# Patient Record
Sex: FEMALE | Race: WHITE | HISPANIC OR LATINO | Employment: UNEMPLOYED | URBAN - METROPOLITAN AREA
[De-identification: names, ages, dates, MRNs, and addresses within clinical notes are randomized per-mention and may not be internally consistent; named-entity substitution may affect disease eponyms.]

---

## 2017-06-11 ENCOUNTER — HOSPITAL ENCOUNTER (EMERGENCY)
Facility: MEDICAL CENTER | Age: 32
End: 2017-06-11
Attending: EMERGENCY MEDICINE
Payer: COMMERCIAL

## 2017-06-11 VITALS
BODY MASS INDEX: 28 KG/M2 | OXYGEN SATURATION: 96 % | TEMPERATURE: 99.3 F | SYSTOLIC BLOOD PRESSURE: 108 MMHG | WEIGHT: 164.02 LBS | HEART RATE: 99 BPM | HEIGHT: 64 IN | RESPIRATION RATE: 16 BRPM | DIASTOLIC BLOOD PRESSURE: 64 MMHG

## 2017-06-11 DIAGNOSIS — J40 BRONCHITIS: ICD-10-CM

## 2017-06-11 PROCEDURE — 99283 EMERGENCY DEPT VISIT LOW MDM: CPT

## 2017-06-11 RX ORDER — AZITHROMYCIN 250 MG/1
TABLET, FILM COATED ORAL
Qty: 6 TAB | Refills: 0 | Status: SHIPPED | OUTPATIENT
Start: 2017-06-11 | End: 2020-01-13

## 2017-06-11 ASSESSMENT — PAIN SCALES - GENERAL: PAINLEVEL_OUTOF10: ASSUMED PAIN PRESENT

## 2017-06-11 ASSESSMENT — LIFESTYLE VARIABLES: DO YOU DRINK ALCOHOL: NO

## 2017-06-11 NOTE — ED AVS SNAPSHOT
Virtual Fairground Access Code: [email or web address]  Expires: 7/11/2017  1:17 PM    Your email address is not on file at [facility].  Email Addresses are required for you to sign up for Virtual Fairground, please contact [phone] to verify your personal information and to provide your email address prior to attempting to register for Virtual Fairground.    Patricia BonillaLarson  [address] [address]    [employer]  A secure, online tool to manage your health information     [facility]’s Virtual Fairground® is a secure, online tool that connects you to your personalized health information from the privacy of your home -- day or night - making it very easy for you to manage your healthcare. Once the activation process is completed, you can even access your medical information using the Virtual Fairground antonina, which is available for free in the Apple Antonina store or Google Play store.     To learn more about Virtual Fairground, visit [email or web address]    There are two levels of access available (as shown below):   My Chart Features  [facility] Primary Care Doctor [facility]  Specialists [facility]  Urgent  Care Non-[facility] Primary Care Doctor   Email your healthcare team securely and privately 24/7 X X X    Manage appointments: schedule your next appointment; view details of past/upcoming appointments X      Request prescription refills. X      View recent personal medical records, including lab and immunizations X X X X   View health record, including health history, allergies, medications X X X X   Read reports about your outpatient visits, procedures, consult and ER notes X X X X   See your discharge summary, which is a recap of your hospital and/or ER visit that includes your diagnosis, lab results, and care plan X X  X     How to register for [employer]:  Once your e-mail address has been verified, follow the following steps to sign up for [employer].     1. Go to  [email or web address]  2. Click on the Sign Up Now box, which takes you to the New Member Sign Up  page. You will need to provide the following information:  a. Enter your Alminder Access Code exactly as it appears at the top of this page. (You will not need to use this code after you’ve completed the sign-up process. If you do not sign up before the expiration date, you must request a new code.)   b. Enter your date of birth.   c. Enter your home email address.   d. Click Submit, and follow the next screen’s instructions.  3. Create a Alminder ID. This will be your Alminder login ID and cannot be changed, so think of one that is secure and easy to remember.  4. Create a Alminder password. You can change your password at any time.  5. Enter your Password Reset Question and Answer. This can be used at a later time if you forget your password.   6. Enter your e-mail address. This allows you to receive e-mail notifications when new information is available in Alminder.  7. Click Sign Up. You can now view your health information.    For assistance activating your Alminder account, call [phone]

## 2017-06-11 NOTE — ED PROVIDER NOTES
"ED Provider Note    CHIEF COMPLAINT  Chief Complaint   Patient presents with   • Cough   • Congestion       HPI  Patricia Denise is a 31 y.o. female who presents for evaluation of upper history symptoms.  Patient states her last day she developed nasal congestion, sore throat, cough with yellow to green sputum production along with generalized malaise.  Several family members have similar symptoms.  The patient denies: Night sweats, hemoptysis, abdominal pain, vomiting, hematemesis, melena, hematochezia, pregnancy, palpitations, syncope, pain or swelling in lower extremities.  No recent travel.  No recent antibiotic use.  Patient's history was obtained.  Family member who is interpreting along with my limited [place].  No other acute symptomatology or complaints.    REVIEW OF SYSTEMS  See HPI for further details.  No history of: Hypertension, diabetes, seizures, cardiopulmonary disorder, gastrointestinal disorder, asthma.  All other systems negative.    PAST MEDICAL HISTORY  Past Medical History   Diagnosis Date   • Thyroid disease      2006- Nodule; Bx was benign       FAMILY HISTORY  Family History   Problem Relation Age of Onset   • Cancer Maternal Grandfather        SOCIAL HISTORY  Nonsmoker; rare alcohol use; no drug use;    SURGICAL HISTORY  History reviewed. No pertinent past surgical history.    CURRENT MEDICATIONS  See nurses notes    ALLERGIES  No Known Allergies    PHYSICAL EXAM  VITAL SIGNS: /64 mmHg  Pulse 109  Temp(Src) 37.4 °C (99.3 °F)  Resp 18  Ht 1.626 m (5' 4\")  Wt 74.4 kg (164 lb 0.4 oz)  BMI 28.14 kg/m2  SpO2 98%   Constitutional: 31  female, Well developed, Well nourished, appears mildly weak but is oriented ×3  HENT: Normocephalic, Atraumatic, Nares:Clear, Oropharynx: moist, well hydrated, posterior pharynx:clear   Eyes: PERRL, EOMI, Conjunctiva normal, No discharge.   Neck: Normal range of motion, No tenderness, Supple, No stridor.   Lymphatic: No lymphadenopathy " noted.   Cardiovascular: Regular rate and rhythm without mumurs, gallups, rubs   Thorax & Lungs: Mild bilateral rhonchi, No respiratory distress, No wheezing, no stridor, no rales. No chest tenderness.   Abdomen: Soft, nontender, nondistended, no organomegaly, positive bowel sounds normal in quality. No guarding or rebound.  Skin: Good skin turgor, pink, warm, dry. No rashes, petechiae, purpura. Normal capillary refill.   Back: No tenderness, No CVA tenderness.   Extremities: Intact distal pulses, No edema, No tenderness, No cyanosis,  Vascular: Pulses are 2+, symmetric in the upper and lower extremities.  Negative Homans sign;  Musculoskeletal: Good range of motion in all major joints. No tenderness to palpation or major deformities noted.   Neurologic: Alert & oriented x 3,  No gross focal deficits noted.   Psychiatric: Affect normal, Judgment normal, Mood normal.     COURSE & MEDICAL DECISION MAKING  Pertinent Labs & Imaging studies reviewed. (See chart for details)  Discussion: At this time, the patient presents for evaluation of upper history symptoms.  The patient has no evidence of pneumonia on clinical exam and has no hypoxia.  There is also no associated bronchospasm at this time.  No signs of sepsis or toxicity.  Based on exam and findings, I see no indication for emergent laboratory imaging studies.  I discussed the findings and treatment plan with the patient.  She indicates she is comfortable with this exploration and disposition(through the ).    FINAL IMPRESSION  1. Bronchitis        PLAN  1.  Appropriate discharge instructions given  2.  Azithromycin, Z-Geo  3.  Tussionex syrup, 60 mL  4.  Follow-up with primary care  5.  Return at any time should she feel this is a change or worsening symptoms    Electronically signed by: Guy G Gansert, 6/11/2017 1:05 PM

## 2017-06-11 NOTE — ED AVS SNAPSHOT
Home Care Instructions                                                                                                                Patricia Denise   MRN: [id number]    Department:  [facility], Emergency Dept   Date of Visit:  6/11/2017            [facility], Emergency Dept    [address] [address]    Phone:  [phone]      You were seen by     Guy G Gansert, M.D.      Your Diagnosis Was     Bronchitis     J40       Follow-up Information     1. Follow up with [facility].    Contact information    [address] [place] [address] [phone]      Medication Information     Review all of your home medications and newly ordered medications with your primary doctor and/or pharmacist as soon as possible. Follow medication instructions as directed by your doctor and/or pharmacist.     Please keep your complete medication list with you and share with your physician. Update the information when medications are discontinued, doses are changed, or new medications (including over-the-counter products) are added; and carry medication information at all times in the event of emergency situations.               Medication List      START taking these medications        Instructions    Morning Afternoon Evening Bedtime    azithromycin 250 MG Tabs   Commonly known as:  ZITHROMAX        Take two tabs by mouth on day one, then one tab by mouth daily on days 2-5.                        Hydrocod Polst-CPM Polst ER 10-8 MG/5ML Suer   Commonly known as:  TUSSIONEX PENNKINETIC ER        Take 5 mL by mouth every 12 hours as needed for Cough or Rhinitis.   Dose:  5 mL                          ASK your doctor about these medications        Instructions    Morning Afternoon Evening Bedtime     MG Caps        Take 100 mg by mouth 2 times a day as needed for Constipation.   Dose:  100 mg                        ibuprofen 800 MG Tabs   Commonly known as:   MOTRIN        Take 1 Tab by mouth every 8 hours as needed (Cramping).   Dose:  800 mg                        prenatal plus vitamin 27-1 MG Tabs tablet        Take 1 Tab by mouth every morning.   Dose:  1 Tab                             Where to Get Your Medications      You can get these medications from any pharmacy     Bring a paper prescription for each of these medications    - azithromycin 250 MG Tabs  - Hydrocod Polst-CPM Polst ER 10-8 MG/5ML Suer              Discharge Instructions       Bronquitis  (Bronchitis)  La bronquitis es angelita inflamación (el modo que tiene el organismo de reaccionar a angelita lesión o infección) de los bronquios Los bronquios son los conductos que se extienden desde la tráquea hasta los pulmones. Si la inflamación se agrava, puede causar la falta de aire.  CAUSAS  Las causas de la inflamación pueden ser:  · Un virus  · Gérmenes (bacteria).  · Polvo  · Alergenos  · La polución y muchos otros irritantes  Las células que revisten el árbol bronquial están cubiertas con pequeños pelos (cilias). Esta constantemente producen un movimiento desde los pulmones hacia la boca. De jovon modo se mantienen los pulmones libres de polución. Cuando estas células se irritan y no pueden cumplir sofia función, comienza a formarse la mucosidad. [place] produce la característica tos de la bronquitis. La tos es el mecanismo por el cual se limpian los pulmones cuando las cilias no pueden cumplir sofia función. Sin alguno de estos mecanismos protectores, el material se acumularía en los pulmones Entonces se desarrollaría angelita pulmonía.   El fumar es angelita de las causas más frecuentes de bronquitis y puede contribuir a la neumonía. Abandonar jovon hábito es lo más importante que puede hacer para beneficiarse.  TRATAMIENTO  · El médico le prescribirá antibióticos si la causa es angelita bacteria, y medicamentos para abrir las vías aéreas y facilitar la respiración. También puede recomendar o prescribir un expectorante. El expectorante  aflojará la mucosidad para que pueda eliminarla. Sólo tome medicamentos de venta char o prescriptos para calmar el dolor, las molestias, o bajar la fiebre según las indicaciones de sofia médico.  · Eliminar todo lo que causa el problema (por ejemplo el hábito de fumar) es fundamental para evitar que empeore.  · Un antitusígeno puede prescribirse para aliviar los síntomas de la tos.  · Podrán indicarle inhalantes para aliviar los síntomas actuales y ayudar a prevenir problemas futuros.  · Aquellos que sufren bronquitis crónica (recurrente) puede ser necesaria la administración de corticoides.  SOLICITE ATENCIÓN MÉDICA INMEDIATAMENTE SI:  · Jonnathan el tratamiento observa que elimina esputo similar a pus (purulento).  · Tiene fiebre.  · Sofia bebé tiene más de 3 meses y sofia temperatura rectal es de 102° F (38.9° C) o más.  · Sofia bebé tiene 3 meses o menos y sofia temperatura rectal es de 100.4° F (38° C) o más.  · Se siente cada vez más enfermo.  · Tiene cada vez más dificultad para respirar, tiene ruidos al respirar o le falta el aire.  Es necesario buscar atención médica inmediata si es angelita persona de edad avanzada o sufre alguna otra enfermedad.  ASEGURESE DE QUE:   · Comprende estas instrucciones.  · Controlará sofia enfermedad.  · Solicitará ayuda inmediatamente si no mejora o si empeora.  Document Released: 12/18/2006 Document Revised: 03/11/2013  ExitCare® Patient Information ©2014 [employer].            Patient Information     Patient Information    Following emergency treatment: all patient requiring follow-up care must return either to a private physician or a clinic if your condition worsens before you are able to obtain further medical attention, please return to the emergency room.     Billing Information    At [facility], we work to make the billing process streamlined for our patients.  Our Representatives are here to answer any questions you may have regarding your hospital bill.  If you have insurance  coverage and have supplied your insurance information to us, we will submit a claim to your insurer on your behalf.  Should you have any questions regarding your bill, we can be reached online or by phone as follows:  Online: You are able pay your bills online or live chat with our representatives about any billing questions you may have. We are here to help Monday - Friday from 8:00am to 7:30pm and 9:00am - 12:00pm on Saturdays.  Please visit [email or web address]  for more information.   Phone:  [phone] or [phone]    Please note that your emergency physician, surgeon, pathologist, radiologist, anesthesiologist, and other specialists are not employed by [facility] and will therefore bill separately for their services.  Please contact them directly for any questions concerning their bills at the numbers below:     Emer[facility] Physician Services:  [phone]  [facility]:  [phone]  [facility]:  [phone]  [facility]:  [phone]    1. Your final bill may vary from the amount quoted upon discharge if all procedures are not complete at that time, or if your doctor has additional procedures of which we are not aware. You will receive an additional bill if you return to the Emergency Department at [facility] for suture removal regardless of the facility of which the sutures were placed.     2. Please arrange for settlement of this account at the emergency registration.    3. All self-pay accounts are due in full at the time of treatment.  If you are unable to meet this obligation then payment is expected within 4-5 days.     4. If you have had radiology studies (CT, X-ray, Ultrasound, MRI), you have received a preliminary result during your emergency department visit. Please contact the radiology department [phone] to receive a copy of your final result. Please discuss the Final result with your primary  physician or with the follow up physician provided.     Crisis Hotline:  Onset Crisis Hotline:  7-172-CFEUYAJ or 1-429.391.5352  [place] Crisis Hotline:    1-597.367.8465 or 245-518-4371         ED Discharge Follow Up Questions    1. In order to provide you with very good care, we would like to follow up with a phone call in the next few days.  May we have your permission to contact you?     YES /  NO    2. What is the best phone number to call you? (       )_____-__________    3. What is the best time to call you?      Morning  /  Afternoon  /  Evening                   Patient Signature:  ____________________________________________________________    Date:  ____________________________________________________________

## 2017-06-11 NOTE — ED AVS SNAPSHOT
6/11/2017    Patricia Denise  [address]  [place] [address]    Dear Patricia:    [facility] wants to ensure your discharge home is safe and you or your loved ones have had all of your questions answered regarding your care after you leave the hospital.    Below is a list of resources and contact information should you have any questions regarding your hospital stay, follow-up instructions, or active medical symptoms.    Questions or Concerns Regarding… Contact   Medical Questions Related to Your Discharge  (7 days a week, 8am-5pm) Contact a Nurse Care Coordinator   [phone]   Medical Questions Not Related to Your Discharge  (24 hours a day / 7 days a week)  Contact the Nurse Health Line   [phone]    Medications or Discharge Instructions Refer to your discharge packet   or contact your [facility] Primary Care Provider   [phone]   Follow-up Appointment(s) Schedule your appointment via [employer]   or contact Scheduling [phone]   Billing Review your statement via [employer]  or contact Billing [phone]   Medical Records Review your records via [employer]   or contact Medical Records [phone]     You may receive a telephone call within two days of discharge. This call is to make certain you understand your discharge instructions and have the opportunity to have any questions answered. You can also easily access your medical information, test results and upcoming appointments via the [employer] free online health management tool. You can learn more and sign up at [employer]. For assistance setting up your [employer] account, please call [phone].    Once again, we want to ensure your discharge home is safe and that you have a clear understanding of any next steps in your care. If you have any questions or concerns, please do not hesitate to contact us, we are here for you. Thank you for choosing [facility] for your healthcare needs.    Sincerely,    Your [facility] Healthcare Team

## 2017-06-11 NOTE — ED NOTES
Pt arrived pov with c/o cough with green phlem. Pt also feels sob/congested. Tearful in triage. Pt stated this started tues. dnies n/v/d,

## 2017-06-11 NOTE — DISCHARGE INSTRUCTIONS
Bronquitis  (Bronchitis)  La bronquitis es angelita inflamación (el modo que tiene el organismo de reaccionar a angelita lesión o infección) de los bronquios Los bronquios son los conductos que se extienden desde la tráquea hasta los pulmones. Si la inflamación se agrava, puede causar la falta de aire.  CAUSAS  Las causas de la inflamación pueden ser:  · Un virus  · Gérmenes (bacteria).  · Polvo  · Alergenos  · La polución y muchos otros irritantes  Las células que revisten el árbol bronquial están cubiertas con pequeños pelos (cilias). Esta constantemente producen un movimiento desde los pulmones hacia la boca. De jovon modo se mantienen los pulmones libres de polución. Cuando estas células se irritan y no pueden cumplir sofia función, comienza a formarse la mucosidad. [place] produce la característica tos de la bronquitis. La tos es el mecanismo por el cual se limpian los pulmones cuando las cilias no pueden cumplir sofia función. Sin alguno de estos mecanismos protectores, el material se acumularía en los pulmones Entonces se desarrollaría angelita pulmonía.   El fumar es angelita de las causas más frecuentes de bronquitis y puede contribuir a la neumonía. Abandonar jovon hábito es lo más importante que puede hacer para beneficiarse.  TRATAMIENTO  · El médico le prescribirá antibióticos si la causa es angelita bacteria, y medicamentos para abrir las vías aéreas y facilitar la respiración. También puede recomendar o prescribir un expectorante. El expectorante aflojará la mucosidad para que pueda eliminarla. Sólo tome medicamentos de venta char o prescriptos para calmar el dolor, las molestias, o bajar la fiebre según las indicaciones de sofia médico.  · Eliminar todo lo que causa el problema (por ejemplo el hábito de fumar) es fundamental para evitar que empeore.  · Un antitusígeno puede prescribirse para aliviar los síntomas de la tos.  · Podrán indicarle inhalantes para aliviar los síntomas actuales y ayudar a prevenir problemas futuros.  · Aquellos  que sufren bronquitis crónica (recurrente) puede ser necesaria la administración de corticoides.  SOLICITE ATENCIÓN MÉDICA INMEDIATAMENTE SI:  · Jonnathan el tratamiento observa que elimina esputo similar a pus (purulento).  · Tiene fiebre.  · Sofia bebé tiene más de 3 meses y sofia temperatura rectal es de 102° F (38.9° C) o más.  · Sofia bebé tiene 3 meses o menos y sofia temperatura rectal es de 100.4° F (38° C) o más.  · Se siente cada vez más enfermo.  · Tiene cada vez más dificultad para respirar, tiene ruidos al respirar o le falta el aire.  Es necesario buscar atención médica inmediata si es angelita persona de edad avanzada o sufre alguna otra enfermedad.  ASEGURESE DE QUE:   · Comprende estas instrucciones.  · Controlará sofia enfermedad.  · Solicitará ayuda inmediatamente si no mejora o si empeora.  Document Released: 12/18/2006 Document Revised: 03/11/2013  ExitSway® Patient Information ©2014 [employer].

## 2017-06-11 NOTE — ED NOTES
Discharge instructions given to patient and family; patient verbalized understanding. Paper prescriptions given to patient. Patient's VS WNL for baseline/position upon discharge. Patient ambulatory w/ steady gait upon leaving ED w/ significant other.

## 2017-06-12 ENCOUNTER — PATIENT OUTREACH (OUTPATIENT)
Dept: HEALTH INFORMATION MANAGEMENT | Facility: OTHER | Age: 32
End: 2017-06-12

## 2018-09-04 ENCOUNTER — HOSPITAL ENCOUNTER (EMERGENCY)
Facility: MEDICAL CENTER | Age: 33
End: 2018-09-04
Attending: EMERGENCY MEDICINE
Payer: OTHER MISCELLANEOUS

## 2018-09-04 VITALS
BODY MASS INDEX: 28.34 KG/M2 | OXYGEN SATURATION: 98 % | SYSTOLIC BLOOD PRESSURE: 120 MMHG | HEART RATE: 70 BPM | TEMPERATURE: 97.4 F | HEIGHT: 64 IN | DIASTOLIC BLOOD PRESSURE: 77 MMHG | WEIGHT: 166.01 LBS | RESPIRATION RATE: 16 BRPM

## 2018-09-04 DIAGNOSIS — S16.1XXA STRAIN OF NECK MUSCLE, INITIAL ENCOUNTER: Primary | ICD-10-CM

## 2018-09-04 PROCEDURE — 700111 HCHG RX REV CODE 636 W/ 250 OVERRIDE (IP): Performed by: EMERGENCY MEDICINE

## 2018-09-04 PROCEDURE — 96372 THER/PROPH/DIAG INJ SC/IM: CPT

## 2018-09-04 PROCEDURE — 700102 HCHG RX REV CODE 250 W/ 637 OVERRIDE(OP): Performed by: EMERGENCY MEDICINE

## 2018-09-04 PROCEDURE — A9270 NON-COVERED ITEM OR SERVICE: HCPCS | Performed by: EMERGENCY MEDICINE

## 2018-09-04 PROCEDURE — 99284 EMERGENCY DEPT VISIT MOD MDM: CPT

## 2018-09-04 RX ORDER — METHOCARBAMOL 500 MG/1
500 TABLET, FILM COATED ORAL EVERY 6 HOURS PRN
Qty: 20 TAB | Refills: 0 | Status: SHIPPED | OUTPATIENT
Start: 2018-09-04 | End: 2020-01-13

## 2018-09-04 RX ORDER — METHOCARBAMOL 500 MG/1
500 TABLET, FILM COATED ORAL ONCE
Status: COMPLETED | OUTPATIENT
Start: 2018-09-04 | End: 2018-09-04

## 2018-09-04 RX ORDER — KETOROLAC TROMETHAMINE 30 MG/ML
30 INJECTION, SOLUTION INTRAMUSCULAR; INTRAVENOUS ONCE
Status: COMPLETED | OUTPATIENT
Start: 2018-09-04 | End: 2018-09-04

## 2018-09-04 RX ORDER — IBUPROFEN 600 MG/1
600 TABLET ORAL EVERY 6 HOURS PRN
Qty: 20 TAB | Refills: 0 | Status: SHIPPED | OUTPATIENT
Start: 2018-09-04 | End: 2020-01-13

## 2018-09-04 RX ADMIN — METHOCARBAMOL 500 MG: 500 TABLET ORAL at 11:30

## 2018-09-04 RX ADMIN — KETOROLAC TROMETHAMINE 30 MG: 30 INJECTION, SOLUTION INTRAMUSCULAR at 11:30

## 2018-09-04 ASSESSMENT — LIFESTYLE VARIABLES: DO YOU DRINK ALCOHOL: NO

## 2018-09-04 ASSESSMENT — PAIN SCALES - GENERAL
PAINLEVEL_OUTOF10: 8
PAINLEVEL_OUTOF10: 8

## 2018-09-04 NOTE — DISCHARGE INSTRUCTIONS
Follow-up with primary care 1-2 days for reevaluation, medication management close blood pressure monitoring.    Motrin and Robaxin every 6 hours as needed for pain or spasm.  Slow stretching and range of motion exercises as described.  Apply heat as needed for comfort.    Return the emergency department for persistent or worsening neck pain, headache, altered mental status, extremity paresthesias or weakness or other new concerns.    Distensión cervical  (Cervical Sprain)  La distensión cervical se produce cuando los tejidos (ligamentos) del samantha se estiran o se rompen.  CUIDADOS EN EL HOGAR  · Aplique hielo sobre la jenifer lesionada.  ¨ Ponga el hielo en angelita bolsa plástica.  ¨ Colóquese angelita toalla entre la piel y la bolsa de hielo.  ¨ Deje el hielo amando 15 - 20 minutos y aplíquelo 3 - 4 veces por día.  · Es posible que le hayan indicado el uso de un collarín. Kyung collarín impide que el samantha se mueva mientras se gretchen.  ¨ No se lo quite excepto que se lo indique el médico.  ¨ Si tiene el jaymie blanche, manténgalo fuera del collarín.  ¨ Consulte a sofia médico antes de cambiar la posición del collarín. Es posible que necesite cambiar la posición con el tiempo para sentirse más cómodo.  ¨ Si le permiten quitarse el collarín para lavarlo o darse un baño, siga las indicaciones de sofia médico acerca de cómo hacerlo con seguridad.  ¨ Mantenga el collarín limpio pasando un paño con agua y jabón. Séquelo brenda. Si el collarín tiene almohadillas removibles, quítelas cada 1-2 días para lavarlas a mano con agua y jabón. Deje que se sequen al aire. Debe secarlas brenda antes de volver a colocarlas en el collarín.  ¨ No conduzca vehículos mientras usa el collarín.  · Sólo tome los medicamentos que le haya indicado sofia médico.  · Cumpla con las visitas al médico según las indicaciones.  · Cumpla con las sesiones de fisioterapia, según las indicaciones.  · Ajuste sofia gomes de trabajo de modo que tenga angelita buena postura al  trabajar.  · Evite las posiciones y actividades que empeoran los síntomas.  · Delvis un precalentamiento y elongación adecuados antes de la actividad.  SOLICITE AYUDA SI:  · El dolor no se murphy con los medicamentos.  · No puede disminuir las dosis de medicamentos para el dolor luego de un tiempo, según lo planificado.  · Sofia nivel de actividad no mejora según lo esperado.  SOLICITE AYUDA DE INMEDIATO SI:  · Tiene sangrado.  · Siente malestar en el estómago.  · Tiene alguna reacción a los medicamentos.  · Tiene síntomas nuevos que no puede explicar.  · Pierde la sensibilidad (adormecimiento) o no puede  alguna parte del cuerpo (parálisis).  · Siente hormigueo o debilidad en alguna parte del cuerpo.  · Los síntomas empeoran. Los síntomas son:  ¨ Dolor, sensibilidad, rigidez, inflamación(hinchazón), o sensación de ardor en el samantha.  ¨ Siente dolor cuando le tocan el samantha.  ¨ Dolor en el hombro o la jenifer superior de la espalda.  ¨ Capacidad limitada para  el samantha.  ¨ Dolor de jacky.  ¨ Mareos.  ¨ Debilidad, falta de sensibilidad o sensación hormigueos en las brown o los brazos.  ¨ Espasmos musculares.  ¨ Dificultades para tragar o comer.  ASEGÚRESE DE QUE:  · Comprende estas instrucciones.  · Controlará sofia afección.  · Recibirá ayuda de inmediato si no mejora o si empeora.  Esta información no tiene ameya fin reemplazar el consejo del médico. Asegúrese de hacerle al médico cualquier pregunta que tenga.  Document Released: 12/06/2012 Document Revised: 08/20/2014 Document Reviewed: 06/25/2014  Elsevier Interactive Patient Education © 2017 [employer]
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
## 2019-09-23 ENCOUNTER — HOSPITAL ENCOUNTER (OUTPATIENT)
Facility: MEDICAL CENTER | Age: 34
End: 2019-09-23
Attending: NURSE PRACTITIONER
Payer: COMMERCIAL

## 2019-09-23 ENCOUNTER — INITIAL PRENATAL (OUTPATIENT)
Dept: OBGYN | Facility: CLINIC | Age: 34
End: 2019-09-23

## 2019-09-23 VITALS — SYSTOLIC BLOOD PRESSURE: 106 MMHG | DIASTOLIC BLOOD PRESSURE: 60 MMHG

## 2019-09-23 DIAGNOSIS — Z34.82 ENCOUNTER FOR SUPERVISION OF OTHER NORMAL PREGNANCY IN SECOND TRIMESTER: Primary | ICD-10-CM

## 2019-09-23 PROBLEM — N39.0 URINARY TRACT INFECTION WITHOUT HEMATURIA: Status: ACTIVE | Noted: 2019-09-23

## 2019-09-23 PROBLEM — Z34.92 ENCOUNTER FOR SUPERVISION OF NORMAL PREGNANCY IN SECOND TRIMESTER: Status: ACTIVE | Noted: 2019-09-23

## 2019-09-23 LAB
APPEARANCE UR: NORMAL
BILIRUB UR STRIP-MCNC: NORMAL MG/DL
COLOR UR AUTO: NORMAL
GLUCOSE UR STRIP.AUTO-MCNC: NEGATIVE MG/DL
KETONES UR STRIP.AUTO-MCNC: NEGATIVE MG/DL
LEUKOCYTE ESTERASE UR QL STRIP.AUTO: NORMAL
NITRITE UR QL STRIP.AUTO: POSITIVE
PH UR STRIP.AUTO: 7 [PH] (ref 5–8)
PROT UR QL STRIP: NORMAL MG/DL
RBC UR QL AUTO: NORMAL
SP GR UR STRIP.AUTO: 1.02
UROBILINOGEN UR STRIP-MCNC: NORMAL MG/DL

## 2019-09-23 PROCEDURE — 0500F INITIAL PRENATAL CARE VISIT: CPT | Performed by: NURSE PRACTITIONER

## 2019-09-23 PROCEDURE — 81002 URINALYSIS NONAUTO W/O SCOPE: CPT | Performed by: NURSE PRACTITIONER

## 2019-09-23 PROCEDURE — 8198 PREG CTR PKG RATE (SYSTEM): Performed by: NURSE PRACTITIONER

## 2019-09-23 RX ORDER — NITROFURANTOIN 25; 75 MG/1; MG/1
100 CAPSULE ORAL 2 TIMES DAILY
Qty: 20 CAP | Refills: 0 | Status: SHIPPED | OUTPATIENT
Start: 2019-09-23 | End: 2020-01-13

## 2019-09-23 ASSESSMENT — ENCOUNTER SYMPTOMS
CARDIOVASCULAR NEGATIVE: 1
PSYCHIATRIC NEGATIVE: 1
RESPIRATORY NEGATIVE: 1
EYES NEGATIVE: 1
MUSCULOSKELETAL NEGATIVE: 1
CONSTITUTIONAL NEGATIVE: 1
NEUROLOGICAL NEGATIVE: 1
GASTROINTESTINAL NEGATIVE: 1

## 2019-09-23 NOTE — LETTER
Estudios Para Detectar los Portadores de la Fibrosis Quística   (La Enfermedad Fibroquística del Páncreas)    SangeetaMeg Bonilla-Larson    Esta información esta relacionada con un examen de rachel que puede determinar si usted o sofia sathya es portador del gen que causa la enfermedad hereditaria que se llama la fibrosis quística.     ¿QUE ES LA ENFERMEDAD FIBROSIS QUÍSTICA?  · La  fibrosis quística es angelita enfermedad hereditaria que afecta a más de 25,000 niños y jóvenes adultos americanos.  · Los síntomas de la fibrosis quística varían de angelita persona a otra.  Los síntomas más comunes son congestión pulmonar, diarrea y retraso en el crecimiento del gabino.  La mayoría de las personas con la fibrosis quística padecen de problemas médicos muy severos, y algunas mueren jóvenes.  Otras tienen tan pocos síntomas que no se percatan de que tienen fibrosis quística.  · La fibrosis quística no afecta en absoluto la inteligencia de la persona.  · Aunque actualmente no hay angelita gretchen para la fibrosis quística, los científicos están avanzando con respecto a nuevos tratamientos y en la búsqueda de la gretchen.  Anteriormente la mayoría de las personas que padecían de fibrosis quística morían muy jóvenes.  Hoy en día, muchas personas que padecen de la fibrosis quística sobreviven hasta los 20 o 30 anos de edad.    ¿EXISTE LA POSIBILIDAD DE QUE MI KELLEY PUEDA TENER FIBROSIS QUÍSTICA?  · Usted puede tener un hijo con la fibrosis quística aun cuando no hay nadie en sofia ruth que la padezca.  (Bigg la grafica que sigue.)  · Existe angelita prueba que puede ayudarle a determinar si ramiro un gen para la fibrosis quística y si por eso corre un riesgo de tener un hijo con la enfermedad.  · Si ambos padres son portadores del gen para la fibrosis quística, hay angelita (1) probabilidad entre 4 (25%) en cada embarazo, de que puedan tener un hijo afectada con fibrosis quística.  · Los portadores del gen para la fibrosis quística tienen angelita copia del gen  normal y el otro gen alterado.  · Las personas con fibrosis quística tienen dos genes alterados para la fibrosis quística,  · Normalmente la mayoría de individuos tienen dos copias normales del gen para fibrosis quística.    Riesgo aproximado de que angelita sathya sin familiares con fibrosis quística pueda tener un hijo con fibrosis quística:  Origen / Riesgo  Angelita sathya Caucásica (de vanda dano):  1 en 2,500  Angelita sathya :  1 en 8,000  Angelita sathya Afroamericana (de vanda chelsea):  1 en 15,000  Angelita sathya Asiática:  1 en 32,000    ¿EXISTEN PRUEBAS PARA DETECTAR LOS PORTADORES DE LA FIBROSIS QUÍSTICA?  · Hay angelita prueba de rachel para determinar si usted o sofia sathya portan el gen para la fibrosis quística.  · Es importante entender que la prueba no detecta todos los portadores del gen para la fibrosis quística.  · Si la prueba determina que ambos padres con portadores, se puede realizar otras pruebas para determinar si sofia futuro lidia esta afectado.    ¿CUANTO JOVANNI LA PRUEBA PARA DETERMINAR SI SOY PORTADOR(A) DEL GEN PARA LA FIBROSIS QUÍSTICA?  · El costo de la prueba varia según sofia póliza de seguro medico y el laboratorio donde se efectúa el análisis.  · El costo promedio de la prueba es de $300.  · Sofia consejera genética puede darle mas información acera de esta prueba para determinar si usted es portador de la fibrosis quística.    _____  Si, yo estoy interesada y me gustaria obtener mas información al respecto.  _____  No tengo interés ni en hacerme la prueba para determinar si soy portador(a) de gen para la fibrosis quística ni en recibir mas información al respecto.    Firma del paciente: _____________________________   9/23/2019

## 2019-09-23 NOTE — PROGRESS NOTES
Subjective:      S:  Patricia Denise is a 34 y.o.  female  between [id number] with questionable LMP who presents for her new OB exam. Pt is unsure of LMP as her periods have been irregular since Feb. She was given conflicting date at [facility]. She did have an IUD in place but was told no strings were visible. She reports n/v.  Desires AFP when appropriate.  Declines CF.  Reports no FM, VB, LOF, or cramping.  Denies dysuria, vaginal DC.  Pt is  and lives with FOB and children. Works as , no heavy lifting.  Pregnancy is unplanned but desired. Pt does not want any more pregnancies..  Not appropriate for Centering Pregnancy.    O:    Vitals:    19 0758   BP: 106/60    See H&P Prenatal Physical.  Wet mount: not indicated        FHTs: 155        Fundal ht: 12cm         POC UA: positive nitrites        No IUD strings seen    A:   1.  IUP @ Unknown EL: Estimated Date of Delivery: between  3/25/20-20 w unsure LMP        2.  S=D        3.    Patient Active Problem List    Diagnosis Date Noted   • Encounter for supervision of normal pregnancy in second trimester 2019         P:  1.  GC/CT done. Pap done.         2.  Prenatal labs ordered - lab slip given        3.  Discussed PNV, diet, and adequate water intake        4.  NOB packet given        5.  Return to office in 1-2 wks for dating scan        6.  Complete OB US deferred until dating        7.  Rx for Macrobid        8.  Handout and information given for n/v        9.  Flu vaccine declined    HPI    Review of Systems   Constitutional: Negative.    HENT: Negative.    Eyes: Negative.    Respiratory: Negative.    Cardiovascular: Negative.    Gastrointestinal: Negative.    Genitourinary: Negative.    Musculoskeletal: Negative.    Skin: Negative.    Neurological: Negative.    Endo/Heme/Allergies: Negative.    Psychiatric/Behavioral: Negative.           Objective:     /60      Physical Exam   Constitutional: She is  oriented to person, place, and time. She appears well-developed and well-nourished.   Neck: Normal range of motion. Neck supple.   Cardiovascular: Normal rate, regular rhythm and normal heart sounds.   Pulmonary/Chest: Effort normal and breath sounds normal.   Abdominal: Soft.   Genitourinary: Vagina normal and uterus normal.   Genitourinary Comments: Uterus enlarged, c/w 12 wk ga   Musculoskeletal: Normal range of motion.   Neurological: She is alert and oriented to person, place, and time. She has normal reflexes.   Skin: Skin is warm and dry.   Psychiatric: She has a normal mood and affect. Her behavior is normal. Judgment and thought content normal.   Nursing note and vitals reviewed.              Assessment/Plan:     1. Encounter for supervision of other normal pregnancy in second trimester    - PREG CNTR PRENATAL PN; Future  - THINPREP RFLX HPV ASCUS W/CTNG; Future  - URINE DRUG SCREEN W/CONF (AR); Future  - POCT Urinalysis

## 2019-09-23 NOTE — LETTER
Cystic Fibrosis Carrier Testing  Patricia Denise    The following information is about a blood test that can be done to determine if you and/or your partner carry the gene for cystic fibrosis.    WHAT IS CYSTIC FIBROSIS?  · Cystic fibrosis (CF) is an inherited disease that affects more than 25,000 American children and young adults.  · Symptoms of CF vary but include lung congestion, pneumonia, diarrhea and poor growth.  Most people with CF have severe medical problems and some die at a young age.  Others have so few symptoms they are unaware they have CF.  · CF does not affect intelligence.  · Although there is no cure for CF at this time, scientists are making progress in improving treatment and in searching for a cure.  In the past many people with CF  at a very young age.  Today, many are living into their 20’s and 30’s.    IS THERE A CHANCE MY BABY COULD HAVE CYSTIC FIBROSIS?  · You can have a child with CF even if there is no history in your family (see chart below).  · CF testing can help determine if you are a carrier and at risk to have a child with CF.  Note: if both parents are carriers, there is a 1 in 4 (25%) chance with each pregnancy that they will have a child with CF.  · Carriers have one normal CF gene and one altered CF gene.  · People with CF have two altered CF genes.  · Most people have two normal copies of the CF gene.    Approximate risk that a couple with no family history of cystic fibrosis will have a child with cystic fibrosis:    Ethnic background / Risk     couple:  1 in 2,500   couple:  1 in 15,000            couple:  1 in 8,000     American couple:  1 in 32,000     WHAT TESTING IS AVAILABLE?  · There is a blood test that can be done to find out if you or your partner is a carrier.  · It is important to understand that CF carrier testing does not detect all CF carriers.  · If the test shows that you are both CF carriers, you unborn baby  can be tested to find out if the baby has CF.    HOW MUCH DOES IT COST TO HAVE CYSTIC FIBROSIS CARRIER TESTING?  · Cost and insurance coverage for CF carrier testing vary depending upon the laboratory used and your insurance policy.  · The average cost for CF carrier testing is $300 per person.  · Your genetic counselor can provide you with more information about cystic fibrosis carrier testing.    _____  Yes, I am interested in discussing carrier testing with a genetic counselor.    _____  No, I am not interested in CF carrier testing or in receiving more information about CF carrier testing.      Client signature: ________________________________________  9/23/2019

## 2019-09-23 NOTE — PROGRESS NOTES
Pt here today for NOB visit   LMP: Unknown  Pt states she sometimes feels very light pulsing   WT: 156 lb  BP: 106/60  Preferred pharmacy verified with pt.  Influenza vaccine offered today, pt declines vaccine  Desires AFP  Desires BTL  Pt states having nausea and dizziness all the time. States no other complaints or concerns.   Danilo # [phone]

## 2019-09-24 DIAGNOSIS — Z34.82 ENCOUNTER FOR SUPERVISION OF OTHER NORMAL PREGNANCY IN SECOND TRIMESTER: ICD-10-CM

## 2019-09-26 LAB
C TRACH DNA GENITAL QL NAA+PROBE: NEGATIVE
CYTOLOGY REG CYTOL: NORMAL
N GONORRHOEA DNA GENITAL QL NAA+PROBE: NEGATIVE
SPECIMEN SOURCE: NORMAL

## 2019-10-07 ENCOUNTER — APPOINTMENT (OUTPATIENT)
Dept: OBGYN | Facility: CLINIC | Age: 34
End: 2019-10-07
Payer: MEDICAID

## 2019-10-07 ENCOUNTER — ROUTINE PRENATAL (OUTPATIENT)
Dept: OBGYN | Facility: CLINIC | Age: 34
End: 2019-10-07

## 2019-10-07 VITALS — DIASTOLIC BLOOD PRESSURE: 82 MMHG | SYSTOLIC BLOOD PRESSURE: 122 MMHG

## 2019-10-07 DIAGNOSIS — N93.8 DUB (DYSFUNCTIONAL UTERINE BLEEDING): ICD-10-CM

## 2019-10-07 PROCEDURE — 76805 OB US >/= 14 WKS SNGL FETUS: CPT | Performed by: OBSTETRICS & GYNECOLOGY

## 2019-10-07 PROCEDURE — 90040 PR PRENATAL FOLLOW UP: CPT | Performed by: OBSTETRICS & GYNECOLOGY

## 2019-10-07 NOTE — PROGRESS NOTES
Patricia Denise,  34 y.o.  SSfemale presents today with a C/O of :oligomenorrhea. Pt   No LMP recorded. Patient is pregnant.    Patient with un known  LMP, and some history of IUD , which was lost per her report    Subjective : Nausea/Vomiting: No:  Abdominal /pelvic cramping : No :   vaginal bleeding:No      Menstrual Flow : moderate   GYN ROS:  normal menses, no abnormal bleeding, pelvic pain or discharge, no breast pain or new or enlarging lumps on self exam      Past Medical History:   Diagnosis Date   • Thyroid disease     - Nodule; Bx was benign       No past surgical history on file.    Current Birth control:  none    OB History    Para Term  AB Living   5 4 4 0 0 4   SAB TAB Ectopic Molar Multiple Live Births   0 0 0     4      # Outcome Date GA Lbr Rick/2nd Weight Sex Delivery Anes PTL Lv   5 Current            4 Term 11/10/16 40w3d  4.035 kg (8 lb 14.3 oz) F Vag-Spont None N JESUS      Birth Comments: IOL. Pt states no complications   3 Term 11 40w2d  3.799 kg (8 lb 6 oz) F Vag-Spont None N JESUS      Birth Comments: No complications   2 Term 05 40w0d  3.175 kg (7 lb) F Vag-Spont None N JESUS      Birth Comments: no complications.   1 Term 03 40w0d  3.572 kg (7 lb 14 oz) F Vag-Spont None N JESUS      Birth Comments: no complications.           Allergy:      Patient has no known allergies.    Exam;    /82   well-appearing, well-hydrated, well-nourished  normal;   PERRLA, EOMI, fundi grossly normal, no papilledema, no AV nicking, sclera clear  Clear to auscultation  RRR No M  abdomen is soft without significant tenderness, masses, organomegaly or guarding  External genitalia normal, Vagina normal without dischargeLab.    Recent Results (from the past 336 hour(s))   THINPREP RFLX HPV ASCUS W/CTNG    Collection Time: 19  8:43 AM   Result Value Ref Range    Cytology Reg See Path Report     C. trachomatis by PCR Negative Negative    N. gonorrhoeae by PCR  Negative Negative    Source Cervical      Ultrasound :       Per my Read   Trans vaginal/Transabdominal   Second/third trimester findings: BPD: 2.70 cm, CRL 8.05  PArameters c/w 14 w4 days , Placenta localization: posterior, US EL: 04/02/2020    and Positive Cardiac / Fetal Movment     Assessment:    dysfunctional uterine bleeding    Plan:  2 weeks nob

## 2019-10-22 ENCOUNTER — HOSPITAL ENCOUNTER (OUTPATIENT)
Dept: LAB | Facility: MEDICAL CENTER | Age: 34
End: 2019-10-22
Attending: NURSE PRACTITIONER
Payer: COMMERCIAL

## 2019-10-22 ENCOUNTER — ROUTINE PRENATAL (OUTPATIENT)
Dept: OBGYN | Facility: CLINIC | Age: 34
End: 2019-10-22

## 2019-10-22 VITALS — BODY MASS INDEX: 27.29 KG/M2 | SYSTOLIC BLOOD PRESSURE: 99 MMHG | WEIGHT: 159 LBS | DIASTOLIC BLOOD PRESSURE: 58 MMHG

## 2019-10-22 DIAGNOSIS — Z34.82 ENCOUNTER FOR SUPERVISION OF OTHER NORMAL PREGNANCY IN SECOND TRIMESTER: Primary | ICD-10-CM

## 2019-10-22 DIAGNOSIS — N93.8 DUB (DYSFUNCTIONAL UTERINE BLEEDING): ICD-10-CM

## 2019-10-22 DIAGNOSIS — Z34.82 ENCOUNTER FOR SUPERVISION OF OTHER NORMAL PREGNANCY IN SECOND TRIMESTER: ICD-10-CM

## 2019-10-22 LAB
ABO GROUP BLD: NORMAL
APPEARANCE UR: CLEAR
BASOPHILS # BLD AUTO: 0.2 % (ref 0–1.8)
BASOPHILS # BLD: 0.02 K/UL (ref 0–0.12)
BILIRUB UR QL STRIP.AUTO: NEGATIVE
BLD GP AB SCN SERPL QL: NORMAL
COLOR UR: YELLOW
EOSINOPHIL # BLD AUTO: 0.02 K/UL (ref 0–0.51)
EOSINOPHIL NFR BLD: 0.2 % (ref 0–6.9)
ERYTHROCYTE [DISTWIDTH] IN BLOOD BY AUTOMATED COUNT: 46.2 FL (ref 35.9–50)
GLUCOSE UR STRIP.AUTO-MCNC: NEGATIVE MG/DL
HBV SURFACE AG SER QL: NEGATIVE
HCT VFR BLD AUTO: 40.6 % (ref 37–47)
HGB BLD-MCNC: 13.1 G/DL (ref 12–16)
HIV 1+2 AB+HIV1 P24 AG SERPL QL IA: NON REACTIVE
IMM GRANULOCYTES # BLD AUTO: 0.06 K/UL (ref 0–0.11)
IMM GRANULOCYTES NFR BLD AUTO: 0.6 % (ref 0–0.9)
KETONES UR STRIP.AUTO-MCNC: NEGATIVE MG/DL
LEUKOCYTE ESTERASE UR QL STRIP.AUTO: NEGATIVE
LYMPHOCYTES # BLD AUTO: 2.36 K/UL (ref 1–4.8)
LYMPHOCYTES NFR BLD: 22.8 % (ref 22–41)
MCH RBC QN AUTO: 30.4 PG (ref 27–33)
MCHC RBC AUTO-ENTMCNC: 32.3 G/DL (ref 33.6–35)
MCV RBC AUTO: 94.2 FL (ref 81.4–97.8)
MICRO URNS: ABNORMAL
MONOCYTES # BLD AUTO: 0.6 K/UL (ref 0–0.85)
MONOCYTES NFR BLD AUTO: 5.8 % (ref 0–13.4)
NEUTROPHILS # BLD AUTO: 7.28 K/UL (ref 2–7.15)
NEUTROPHILS NFR BLD: 70.4 % (ref 44–72)
NITRITE UR QL STRIP.AUTO: NEGATIVE
NRBC # BLD AUTO: 0 K/UL
NRBC BLD-RTO: 0 /100 WBC
PH UR STRIP.AUTO: 6 [PH] (ref 5–8)
PLATELET # BLD AUTO: 247 K/UL (ref 164–446)
PMV BLD AUTO: 11.5 FL (ref 9–12.9)
PROT UR QL STRIP: NEGATIVE MG/DL
RBC # BLD AUTO: 4.31 M/UL (ref 4.2–5.4)
RBC UR QL AUTO: NEGATIVE
RH BLD: NORMAL
RUBV AB SER QL: 141 IU/ML
SP GR UR STRIP.AUTO: 1.01
TREPONEMA PALLIDUM IGG+IGM AB [PRESENCE] IN SERUM OR PLASMA BY IMMUNOASSAY: NON REACTIVE
UROBILINOGEN UR STRIP.AUTO-MCNC: 0.2 MG/DL
WBC # BLD AUTO: 10.3 K/UL (ref 4.8–10.8)

## 2019-10-22 PROCEDURE — 90040 PR PRENATAL FOLLOW UP: CPT | Performed by: NURSE PRACTITIONER

## 2019-10-22 NOTE — PROGRESS NOTES
OB follow up   + fetal movement.  No VB, LOF or UC's.  Wt: 159      BP: 99/58  Phone #  [phone]  Preferred pharmacy confirmed.  PNP labs being done tomorrow.  AFP declined today.

## 2019-10-22 NOTE — PROGRESS NOTES
S) Pt is a 34 y.o.   at 15w2d  gestation. Routine prenatal care today. Pt having some dizziness when she stands too long, denies LOC or SOB.    Fetal movement Normal  Cramping no  VB no  LOF no   Denies dysuria. Generally feels well today. Good self-care activities identified. Denies headaches, swelling, visual changes, or epigastric pain .     O) There were no vitals taken for this visit.        Labs:       PNL:       GCT:       AFP: given req today       GBS: N/A       Pertinent ultrasound - given req today           A) IUP at 15w2d       S=D         Patient Active Problem List    Diagnosis Date Noted   • Encounter for supervision of normal pregnancy in second trimester 2019   • Urinary tract infection without hematuria 2019          SVE: deferred         TDAP: no       FLU: no        BTL: no       : no       C/S Consent: no       IOL or C/S scheduled: no       LAST PAP: 19 negative         P) s/s ptl vs general discomforts. Fetal movements reviewed. General ed and anticipatory guidance. Nutrition/exercise/vitamin.   Interpretor [id number]  Encouraged increased water intake.    Encouraged to get PNP done and AFP given.    RTC 4 weeks or PRN.   Given US anatomy scan req and to make appt today.

## 2019-10-24 LAB
AMPHET CTO UR CFM-MCNC: NEGATIVE NG/ML
BARBITURATES CTO UR CFM-MCNC: NEGATIVE NG/ML
BENZODIAZ CTO UR CFM-MCNC: NEGATIVE NG/ML
CANNABINOIDS CTO UR CFM-MCNC: NEGATIVE NG/ML
COCAINE CTO UR CFM-MCNC: NEGATIVE NG/ML
DRUG COMMENT 753798: NORMAL
METHADONE CTO UR CFM-MCNC: NEGATIVE NG/ML
OPIATES CTO UR CFM-MCNC: NEGATIVE NG/ML
PCP CTO UR CFM-MCNC: NEGATIVE NG/ML
PROPOXYPH CTO UR CFM-MCNC: NEGATIVE NG/ML

## 2019-10-25 LAB
# FETUSES US: NORMAL
AFP MOM SERPL: 1.52
AFP SERPL-MCNC: 43 NG/ML
AGE - REPORTED: 35.8 YR
CURRENT SMOKER: NO
FAMILY MEMBER DISEASES HX: NO
GA METHOD: NORMAL
GA: NORMAL WK
HCG MOM SERPL: 1.11
HCG SERPL-ACNC: NORMAL IU/L
HX OF HEREDITARY DISORDERS: NO
IDDM PATIENT QL: NO
INHIBIN A MOM SERPL: 1.63
INHIBIN A SERPL-MCNC: 292 PG/ML
INTEGRATED SCN PATIENT-IMP: NORMAL
PATHOLOGY STUDY: NORMAL
SPECIMEN DRAWN SERPL: NORMAL
U ESTRIOL MOM SERPL: 1.22
U ESTRIOL SERPL-MCNC: 0.95 NG/ML

## 2019-11-18 ENCOUNTER — ROUTINE PRENATAL (OUTPATIENT)
Dept: OBGYN | Facility: CLINIC | Age: 34
End: 2019-11-18

## 2019-11-18 VITALS — BODY MASS INDEX: 28.49 KG/M2 | SYSTOLIC BLOOD PRESSURE: 102 MMHG | WEIGHT: 166 LBS | DIASTOLIC BLOOD PRESSURE: 58 MMHG

## 2019-11-18 DIAGNOSIS — R30.9 PAIN WITH URINATION: ICD-10-CM

## 2019-11-18 LAB
APPEARANCE UR: NORMAL
BILIRUB UR STRIP-MCNC: NORMAL MG/DL
COLOR UR AUTO: YELLOW
GLUCOSE UR STRIP.AUTO-MCNC: NEGATIVE MG/DL
KETONES UR STRIP.AUTO-MCNC: NEGATIVE MG/DL
LEUKOCYTE ESTERASE UR QL STRIP.AUTO: NORMAL
NITRITE UR QL STRIP.AUTO: NEGATIVE
PH UR STRIP.AUTO: 6.5 [PH] (ref 5–8)
PROT UR QL STRIP: NEGATIVE MG/DL
RBC UR QL AUTO: NORMAL
SP GR UR STRIP.AUTO: 1.02
UROBILINOGEN UR STRIP-MCNC: NORMAL MG/DL

## 2019-11-18 PROCEDURE — 81002 URINALYSIS NONAUTO W/O SCOPE: CPT | Performed by: NURSE PRACTITIONER

## 2019-11-18 PROCEDURE — 90040 PR PRENATAL FOLLOW UP: CPT | Performed by: NURSE PRACTITIONER

## 2019-11-18 NOTE — PROGRESS NOTES
SUBJECTIVE:  Pt is a 34 y.o.   at 19w1d  gestation. Presents today for follow-up prenatal care. Reports no issues at this time.  Reports fetal movement. Denies cramping/contractions, bleeding or leaking of fluid. Denies dysuria, headaches, N/V. Generally feels well today.     OBJECTIVE:  - See prenatal vitals flow  -   Vitals:    19 0837   BP: 102/58   Weight: 75.3 kg (166 lb)                 ASSESSMENT:   - IUP at 19w1d    - S=D   -   Patient Active Problem List    Diagnosis Date Noted   • Encounter for supervision of normal pregnancy in second trimester 2019   • Urinary tract infection without hematuria 2019         PLAN:  - S/sx pregnancy and labor warning signs vs general discomforts discussed  - Fetal movements and/or kick counts reviewed   - Adequate hydration reinforced  - Nutrition/exercise/vitamin education; continue PNV  - Report never took antibiotic for UTI; UA WNL today   - US next week   - Anticipatory guidance given  - RTC in 4 weeks for follow-up prenatal care

## 2019-11-18 NOTE — PROGRESS NOTES
Pt here today for OB follow up  Reports +FM  WT: 166 lb  BP: 102/58  Pt states coupleof weeks ago she noticed  Few drops of blood when she went to the bathroom and it cleared that day. States no bleeding since.  Good # [phone]

## 2019-11-25 ENCOUNTER — APPOINTMENT (OUTPATIENT)
Dept: RADIOLOGY | Facility: IMAGING CENTER | Age: 34
End: 2019-11-25
Attending: NURSE PRACTITIONER

## 2019-11-25 DIAGNOSIS — Z34.82 ENCOUNTER FOR SUPERVISION OF OTHER NORMAL PREGNANCY IN SECOND TRIMESTER: ICD-10-CM

## 2019-11-25 PROCEDURE — 76805 OB US >/= 14 WKS SNGL FETUS: CPT | Performed by: OBSTETRICS & GYNECOLOGY

## 2019-12-16 ENCOUNTER — ROUTINE PRENATAL (OUTPATIENT)
Dept: OBGYN | Facility: CLINIC | Age: 34
End: 2019-12-16
Payer: MEDICAID

## 2019-12-16 VITALS — SYSTOLIC BLOOD PRESSURE: 104 MMHG | DIASTOLIC BLOOD PRESSURE: 62 MMHG | BODY MASS INDEX: 29.52 KG/M2 | WEIGHT: 172 LBS

## 2019-12-16 DIAGNOSIS — O09.299 HISTORY OF MACROSOMIA IN INFANT IN PRIOR PREGNANCY, CURRENTLY PREGNANT: ICD-10-CM

## 2019-12-16 DIAGNOSIS — Z87.440 HISTORY OF UTI: ICD-10-CM

## 2019-12-16 DIAGNOSIS — Z86.39 HISTORY OF THYROID NODULE: ICD-10-CM

## 2019-12-16 DIAGNOSIS — O09.529 ANTEPARTUM MULTIGRAVIDA OF ADVANCED MATERNAL AGE: ICD-10-CM

## 2019-12-16 PROCEDURE — 90040 PR PRENATAL FOLLOW UP: CPT | Performed by: NURSE PRACTITIONER

## 2019-12-16 NOTE — PROGRESS NOTES
Pt here today for OB follow up @ 23w1d  Pt states denies VB and LOF  Reports +FM  Good # [phone]  Pharmacy Confirmed.  Labs up to date.

## 2019-12-16 NOTE — PROGRESS NOTES
SUBJECTIVE:  Pt is a 34 y.o.   at 23w1d  gestation. Presents today for follow-up prenatal care. Reports no issues at this time.  Reports fetal movement. Denies regular cramping/contractions, bleeding or leaking of fluid. Denies dysuria, headaches, N/V. Generally feels well today except some lower abdominal menstrual cramping when she works on her feet.      OBJECTIVE:  - See prenatal vitals flow  -   Vitals:    19 1103   BP: 104/62   Weight: 78 kg (172 lb)                 ASSESSMENT:   - IUP at 23w1d    - S=D   -   Patient Active Problem List    Diagnosis Date Noted   • History of thyroid nodule 2019   • History of macrosomia in infant in prior pregnancy, currently pregnant 2019   • Advanced maternal age in multigravida 2019   • Encounter for supervision of normal pregnancy in second trimester 2019         PLAN:  - S/sx pregnancy and labor warning signs vs general discomforts discussed  - Fetal movements and/or kick counts reviewed   - Adequate hydration reinforced  - Nutrition/exercise/vitamin education; continue PNV  - GCT ordered to be done after 24 weeks   - TSH as well as hx of thyroid nodule   - Increase water intake   - Anticipatory guidance given  - RTC in 4 weeks for follow-up prenatal care

## 2020-01-06 ENCOUNTER — HOSPITAL ENCOUNTER (OUTPATIENT)
Dept: LAB | Facility: MEDICAL CENTER | Age: 35
End: 2020-01-06
Attending: NURSE PRACTITIONER
Payer: COMMERCIAL

## 2020-01-06 DIAGNOSIS — Z87.440 HISTORY OF UTI: ICD-10-CM

## 2020-01-06 LAB
ERYTHROCYTE [DISTWIDTH] IN BLOOD BY AUTOMATED COUNT: 48.1 FL (ref 35.9–50)
GLUCOSE 1H P 50 G GLC PO SERPL-MCNC: 81 MG/DL (ref 70–139)
HCT VFR BLD AUTO: 38.9 % (ref 37–47)
HGB BLD-MCNC: 12.6 G/DL (ref 12–16)
MCH RBC QN AUTO: 31.3 PG (ref 27–33)
MCHC RBC AUTO-ENTMCNC: 32.4 G/DL (ref 33.6–35)
MCV RBC AUTO: 96.5 FL (ref 81.4–97.8)
PLATELET # BLD AUTO: 197 K/UL (ref 164–446)
PMV BLD AUTO: 11.3 FL (ref 9–12.9)
RBC # BLD AUTO: 4.03 M/UL (ref 4.2–5.4)
TREPONEMA PALLIDUM IGG+IGM AB [PRESENCE] IN SERUM OR PLASMA BY IMMUNOASSAY: NON REACTIVE
TSH SERPL DL<=0.005 MIU/L-ACNC: 1.04 UIU/ML (ref 0.38–5.33)
WBC # BLD AUTO: 10.4 K/UL (ref 4.8–10.8)

## 2020-01-13 ENCOUNTER — ROUTINE PRENATAL (OUTPATIENT)
Dept: OBGYN | Facility: CLINIC | Age: 35
End: 2020-01-13

## 2020-01-13 VITALS — BODY MASS INDEX: 30.73 KG/M2 | WEIGHT: 179 LBS | SYSTOLIC BLOOD PRESSURE: 98 MMHG | DIASTOLIC BLOOD PRESSURE: 62 MMHG

## 2020-01-13 DIAGNOSIS — Z34.82 ENCOUNTER FOR SUPERVISION OF OTHER NORMAL PREGNANCY IN SECOND TRIMESTER: Primary | ICD-10-CM

## 2020-01-13 PROCEDURE — 90040 PR PRENATAL FOLLOW UP: CPT | Performed by: NURSE PRACTITIONER

## 2020-01-13 NOTE — PROGRESS NOTES
S:  Pt is  at 27w1d for routine OB follow up.  Reports an occ BH UC.  Reports good FM.  Denies VB, LOF, RUCs or vaginal DC.    O:  Please see above vitals.        FHTs: 150        Fundal ht: 28 cm.        Complete OB US: WNL.    A:  IUP at 27w1d  Patient Active Problem List    Diagnosis Date Noted   • History of thyroid nodule 2019   • History of macrosomia in infant in prior pregnancy, currently pregnant 2019   • Advanced maternal age in multigravida 2019   • Encounter for supervision of normal pregnancy in second trimester 2019        P:  1.  PP contraception declines BTL.         2.  Instructions given on FKCs.          3.  Questions answered.          4.  Encouraged pt to tour L&D.          5.  Encourage adequate water intake.        6.  1hr GTT, syphilis and H/H WNL - reviewed w pt.        7.   labor precautions reviewed.         8.  F/u 2 wks.        9.  TDap declines.

## 2020-01-13 NOTE — LETTER
Cuente los Movimientos de sofia Bebé  Otro paso importante para la jose de sofia bebé    Sangeetaclifford Denise     [facility]            Dept: [phone]    ¿Cuántas semanas tiene de embarazo? 27w1d    Fecha cuando tiene que comenzar a contar el movimiento: 1/13/2020                  Go debe usar jovon diagrama    Angelita manera en que sofia doctor puede controlar a jose de sofia bebé es sabiendo cuantas veces se mueve sofia bebé en el útero, o por medio de las “pataditas”.  Usted podrá ayudarle a sofia médico al usar cada día el siguiente diagrama.    Cada día, usted debe prestar atención a cuantas horas le lleva a sofia bebé moverse 10 veces.  Comience a contar en la mañana, lo antes posible después de haberse levantado.    · Primeramente, escriba la hora en que se mueve sofia bebé, hasta llegar a 10 veces.  · Colóquele un check o palomita a cada cuadrito cada vez que sofia bebé se mueva hasta que complete 10 veces.  · Escriba la hora cuando termine de contar 10 veces en la última columna.  · Sume el total del tiempo que le llevó contar los 10 movimientos.  · Finalmente, complete el cuadrito de cuantas horas le llevó hacerlo.    Después de yoli contado los 10 movimientos, ya no tendrá que contar los demás movimientos por el vero del día.  A la mañana siguiente, comience a contar de nuevo cuantas veces se mueve el bebé desde el momento en que se levante.    ¿Qué tendría que considerarse un “movimiento”?  Es difícil de decirlo porque es distinto de angelita madre a otra, y de un embarazo a otro.  Lo importante es que cuente el movimiento de la misma manera amando el transcurso de soifa embarazo.  Si tiene preguntas adicionales, pregúntele a sofia doctor.    ¡Cuente cuidadosamente cada día!     MUESTRA:  Semana 28    ¿Cuántas horas le ha llevado sentir 10 movimientos?        Hora de Inicio     1     2     3     4     5     6     7     8     9     10   Hora de Finlizar   Ana. 8:20 ·  ·  ·  ·  ·  ·  ·  ·  ·  ·  11:40   Mar.                  Mié.               Jue.               Vie.               Sáb.               Dom.                 IMPORTANTE:  Usted debe contactar a sofia doctor si le lleva más de 2 horas sentir 10 movimientos de sofia bebé.    Cada mañana, escriba la hora de inicio y comience a contar los movimientos de sofia bebé.  Hágalo colocándole un check o palomita a cada cuadrito cada vez que sienta un movimiento de sofia bebé.  Cuando haya sentido 10 “pataditas”, escriba la hora en que terminó de contar en la última columna.  Luego, complete en la cajita (arriba de la denia de check o palomita) el número total de horas que le llevó hacerlo.  Asegúrese de leer completamente las instrucciones en la página anterior.

## 2020-01-13 NOTE — LETTER
"Count Your Baby's Movements  Another step to a healthy delivery    Patricia Hart             Dept: [phone]    How Many Weeks Pregnant? 27w1d    Date to Begin Countin2020              How to use this chart    One way for your physician to keep track of your baby's health is by knowing how often the baby moves (or \"kicks\") in your womb.  You can help your physician to do this by using this chart every day.    Every day, you should see how many hours it takes for your baby to move 10 times.  Start in the morning, as soon as you get up.    · First, write down the time your baby moves until you get to 10.  · Check off one box every time your baby moves until you get to 10.  · Write down the time you finished counting in the last column.  · Total how long it took to count up all 10 movements.  · Finally, fill in the box that shows how long this took.  After counting 10 movements, you no longer have to count any more that day.  The next morning, just start counting again as soon as you get up.    What should you call a \"movement\"?  It is hard to say, because it will feel different from one mother to another and from one pregnancy to the next.  The important thing is that you count the movements the same way throughout your pregnancy.  If you have more questions, you should ask your physician.    Count carefully every day!  SAMPLE:  Week 28    How many hours did it take to feel 10 movements?       Start  Time     1     2     3     4     5     6     7     8     9     10   Finish Time   Mon 8:20 ·  ·  ·  ·  ·  ·  ·  ·  ·  ·  11:40                  Sat               Sun                 IMPORTANT: You should contact your physician if it takes more than two hours for you to feel 10 movements.  Each morning, write down the time and start to count the movements of your baby.  Keep track by checking off one box every time you feel one movement.  When you have " "felt 10 \"kicks\", write down the time you finished counting in the last column.  Then fill in the   box (over the check lashon) for the number of hours it took.  Be sure to read the complete instructions on the previous page.            "

## 2020-01-13 NOTE — PATIENT INSTRUCTIONS
P:  1.  PP contraception declines BTL.         2.  Instructions given on FKCs.          3.  Questions answered.          4.  Encouraged pt to tour L&D.          5.  Encourage adequate water intake.        6.  1hr GTT, syphilis and H/H WNL - reviewed w pt.        7.   labor precautions reviewed.         8.  F/u 2 wks.        9.  TDap declines.

## 2020-01-13 NOTE — PROGRESS NOTES
Pt here today for OB follow up  Pt states having some pamela cosme.   Reports +  Good # [phone]  Pharmacy Confirmed.  Chaperone offered and declined.   Ranjan sheet and instructions give.   Pt declines tdap.   Pt unsure of BTL.

## 2020-01-24 PROBLEM — Z34.93 ENCOUNTER FOR SUPERVISION OF NORMAL PREGNANCY IN THIRD TRIMESTER: Status: ACTIVE | Noted: 2019-09-23

## 2020-01-27 ENCOUNTER — ROUTINE PRENATAL (OUTPATIENT)
Dept: OBGYN | Facility: CLINIC | Age: 35
End: 2020-01-27

## 2020-01-27 VITALS — BODY MASS INDEX: 31.07 KG/M2 | SYSTOLIC BLOOD PRESSURE: 100 MMHG | DIASTOLIC BLOOD PRESSURE: 60 MMHG | WEIGHT: 181 LBS

## 2020-01-27 DIAGNOSIS — Z34.83 ENCOUNTER FOR SUPERVISION OF OTHER NORMAL PREGNANCY IN THIRD TRIMESTER: ICD-10-CM

## 2020-01-27 PROCEDURE — 90040 PR PRENATAL FOLLOW UP: CPT | Performed by: NURSE PRACTITIONER

## 2020-01-27 NOTE — PROGRESS NOTES
Pt. Here for OB/F/U, Kick Count sheet given and explained to pt.   Good FM  Good # [phone]  Pt states having contractions that come and go.   Pharmacy verified.   BTL consent form signed    Tdap offered and declined.

## 2020-01-27 NOTE — PROGRESS NOTES
SUBJECTIVE:  Pt is a 34 y.o.   at 29w1d  gestation. Presents today for follow-up prenatal care. Reports no issues at this time.  Reports good  fetal movement. Denies regular cramping/contractions, bleeding or leaking of fluid. Denies dysuria, headaches, N/V. Generally feels well today.     OBJECTIVE:  - See prenatal vitals flow  -   Vitals:    20 0837   BP: 100/60   Weight: 82.1 kg (181 lb)                 ASSESSMENT:   - IUP at 29w1d    - S=D   -   Patient Active Problem List    Diagnosis Date Noted   • History of thyroid nodule 2019   • History of macrosomia in infant in prior pregnancy, currently pregnant 2019   • Advanced maternal age in multigravida 2019   • Encounter for supervision of normal pregnancy in third trimester 2019         PLAN:  - S/sx pregnancy and labor warning signs vs general discomforts discussed  - Fetal movements and/or kick counts reviewed   - Adequate hydration reinforced  - Nutrition/exercise/vitamin education; continue PNV  - Continues to decline Tdap  - Anticipatory guidance given  - RTC in 2 weeks for follow-up prenatal care; keep eye on fundal height

## 2020-02-10 ENCOUNTER — ROUTINE PRENATAL (OUTPATIENT)
Dept: OBGYN | Facility: CLINIC | Age: 35
End: 2020-02-10

## 2020-02-10 VITALS — DIASTOLIC BLOOD PRESSURE: 60 MMHG | SYSTOLIC BLOOD PRESSURE: 110 MMHG | WEIGHT: 184 LBS | BODY MASS INDEX: 31.58 KG/M2

## 2020-02-10 DIAGNOSIS — O09.522 MULTIGRAVIDA OF ADVANCED MATERNAL AGE IN SECOND TRIMESTER: ICD-10-CM

## 2020-02-10 DIAGNOSIS — Z34.83 ENCOUNTER FOR SUPERVISION OF OTHER NORMAL PREGNANCY IN THIRD TRIMESTER: ICD-10-CM

## 2020-02-10 PROCEDURE — 90040 PR PRENATAL FOLLOW UP: CPT | Performed by: PHYSICIAN ASSISTANT

## 2020-02-10 NOTE — PROGRESS NOTES
Pt here today for OB follow up  Pt states doing well, no complaints   Reports +  Good # [phone]  Pharmacy Confirmed. W/ pt

## 2020-02-10 NOTE — PROGRESS NOTES
Pt has no complaints with cramping, UCs, Vb, LOF though pt has had incr pressure. +FM. PTL precautions reviewed. Daily FKC recommended. RTC 2 wk or sooner prn.

## 2020-02-24 ENCOUNTER — ROUTINE PRENATAL (OUTPATIENT)
Dept: OBGYN | Facility: CLINIC | Age: 35
End: 2020-02-24

## 2020-02-24 VITALS
DIASTOLIC BLOOD PRESSURE: 56 MMHG | WEIGHT: 182 LBS | BODY MASS INDEX: 31.24 KG/M2 | SYSTOLIC BLOOD PRESSURE: 110 MMHG | TEMPERATURE: 98.7 F

## 2020-02-24 DIAGNOSIS — Z34.83 ENCOUNTER FOR SUPERVISION OF OTHER NORMAL PREGNANCY IN THIRD TRIMESTER: Primary | ICD-10-CM

## 2020-02-24 DIAGNOSIS — O26.893 PREGNANCY HEADACHE IN THIRD TRIMESTER: ICD-10-CM

## 2020-02-24 DIAGNOSIS — O23.43 URINARY TRACT INFECTION IN MOTHER DURING THIRD TRIMESTER OF PREGNANCY: ICD-10-CM

## 2020-02-24 DIAGNOSIS — R51.9 PREGNANCY HEADACHE IN THIRD TRIMESTER: ICD-10-CM

## 2020-02-24 LAB
APPEARANCE UR: NORMAL
BILIRUB UR STRIP-MCNC: NORMAL MG/DL
COLOR UR AUTO: NORMAL
GLUCOSE UR STRIP.AUTO-MCNC: NEGATIVE MG/DL
KETONES UR STRIP.AUTO-MCNC: NORMAL MG/DL
LEUKOCYTE ESTERASE UR QL STRIP.AUTO: NORMAL
NITRITE UR QL STRIP.AUTO: POSITIVE
PH UR STRIP.AUTO: 6 [PH] (ref 5–8)
PROT UR QL STRIP: 100 MG/DL
RBC UR QL AUTO: NORMAL
SP GR UR STRIP.AUTO: 1.02
UROBILINOGEN UR STRIP-MCNC: NORMAL MG/DL

## 2020-02-24 PROCEDURE — 90040 PR PRENATAL FOLLOW UP: CPT | Performed by: NURSE PRACTITIONER

## 2020-02-24 PROCEDURE — 81002 URINALYSIS NONAUTO W/O SCOPE: CPT | Performed by: NURSE PRACTITIONER

## 2020-02-24 RX ORDER — NITROFURANTOIN 25; 75 MG/1; MG/1
100 CAPSULE ORAL 2 TIMES DAILY
Qty: 14 CAP | Refills: 0 | Status: SHIPPED | OUTPATIENT
Start: 2020-02-24 | End: 2020-03-02

## 2020-02-24 NOTE — PROGRESS NOTES
Pt here today for OB follow up  Reports +FM  WT: 182 lb  BP: 110/56  Pt states she has been feeling nauseated and having a hard time keeping food down. States she vomited this morning. States she started to have headaches since last Friday, pt also reports has been having contractions and pelvic pressure for one week.  States no other complaints.   Danilo # [phone]

## 2020-02-24 NOTE — PATIENT INSTRUCTIONS
P:  1.  GBS @ 36 wks.          2.  Continue FKCs.          3.  Questions answered.          4.  Encouraged pt to tour L&D.          5.  Encourage adequate water intake.        6.  F/u 2 wks.        7.  Rx for macrobid sent to pharmacy.

## 2020-02-24 NOTE — PROGRESS NOTES
S:  Pt is  at 33w1d for routine OB follow up.  Reports a bad HA since Sat, tylenol helped a little.  Denies any blurry vision or epigastric pain.  Has been drinking 1 bottle of water daily.  Reports good FM.  Denies VB, LOF, RUCs or vaginal DC.    O:  Please see above vitals. T: 98.7        FHTs: 145        Fundal ht: 35 cm.        Fetal presentation: vertex.        UA: pos nitrates, small blood, 1+ protein.        Neg CVAT    A:  IUP at 33w1d  Patient Active Problem List    Diagnosis Date Noted   • History of thyroid nodule 2019   • History of macrosomia in infant in prior pregnancy - [id number] x 1 2019   • Advanced maternal age in multigravida 2019   • Encounter for supervision of normal pregnancy in third trimester 2019        P:  1.  GBS @ 36 wks.          2.  Continue FKCs.          3.  Questions answered.          4.  Encouraged pt to tour L&D.          5.  Encourage adequate water intake.        6.  F/u 2 wks.        7.  Rx for macrobid sent to pharmacy.

## 2020-03-09 ENCOUNTER — ROUTINE PRENATAL (OUTPATIENT)
Dept: OBGYN | Facility: CLINIC | Age: 35
End: 2020-03-09

## 2020-03-09 VITALS — WEIGHT: 185.8 LBS | BODY MASS INDEX: 31.89 KG/M2 | DIASTOLIC BLOOD PRESSURE: 60 MMHG | SYSTOLIC BLOOD PRESSURE: 108 MMHG

## 2020-03-09 DIAGNOSIS — Z34.83 ENCOUNTER FOR SUPERVISION OF OTHER NORMAL PREGNANCY IN THIRD TRIMESTER: ICD-10-CM

## 2020-03-09 DIAGNOSIS — O09.522 MULTIGRAVIDA OF ADVANCED MATERNAL AGE IN SECOND TRIMESTER: ICD-10-CM

## 2020-03-09 PROCEDURE — 90040 PR PRENATAL FOLLOW UP: CPT | Performed by: PHYSICIAN ASSISTANT

## 2020-03-09 NOTE — PROGRESS NOTES
Pt. Here for OB/FU. Reports Good FM.   Good # [phone]  Pt states having contractions that are more consistent.   Pharmacy verified.

## 2020-03-09 NOTE — PROGRESS NOTES
Pt has no complaints with strong cramping, regular or strong UCs, Vb, LOF though pt has had incr pressure only, wojciech with activity. +FM. GBS next visit. PTL precautions reviewed. Daily FKC recommended. RTC 1 wk or sooner prn.

## 2020-03-16 ENCOUNTER — HOSPITAL ENCOUNTER (OUTPATIENT)
Facility: MEDICAL CENTER | Age: 35
End: 2020-03-16
Attending: NURSE PRACTITIONER
Payer: MEDICAID

## 2020-03-16 ENCOUNTER — ROUTINE PRENATAL (OUTPATIENT)
Dept: OBGYN | Facility: CLINIC | Age: 35
End: 2020-03-16

## 2020-03-16 VITALS — BODY MASS INDEX: 32.27 KG/M2 | SYSTOLIC BLOOD PRESSURE: 110 MMHG | DIASTOLIC BLOOD PRESSURE: 56 MMHG | WEIGHT: 188 LBS

## 2020-03-16 DIAGNOSIS — O09.93 ENCOUNTER FOR SUPERVISION OF HIGH RISK PREGNANCY IN THIRD TRIMESTER, ANTEPARTUM: Primary | ICD-10-CM

## 2020-03-16 DIAGNOSIS — O09.93 ENCOUNTER FOR SUPERVISION OF HIGH RISK PREGNANCY IN THIRD TRIMESTER, ANTEPARTUM: ICD-10-CM

## 2020-03-16 PROCEDURE — 90040 PR PRENATAL FOLLOW UP: CPT | Performed by: NURSE PRACTITIONER

## 2020-03-16 NOTE — PROGRESS NOTES
Pt here today for OB follow up  GBS to be done today  Reports +FM  WT: 188 lb  BP:110/56  Pt states last night she was having contractions for couple of hours. States no complaints or concerns today.   Good # [phone]

## 2020-03-16 NOTE — PROGRESS NOTES
S:  Pt is  at 36w1d here for routine OB follow up.  Report irreglar CTX.  Reports good FM.  Denies VB, LOF, RUCs, or vaginal DC.     O:  Please see above vitals.        FHTs: 145        Fundal ht: 37 cm        Fetal position: vertex        S=D        VE: 3/50/-2. Large amount of curdlike white discharge noted on exam, no foul odor, no report of burning or itching             A:  IUP at 36w1d  Patient Active Problem List    Diagnosis Date Noted   • History of thyroid nodule 2019   • History of macrosomia in infant in prior pregnancy - [id number] x 1 2019   • Advanced maternal age in multigravida 2019   • Encounter for supervision of normal pregnancy in third trimester 2019       P:  1.  Continue FKCs.         2.  Labor precautions given.  Instructions given on where to go.  Pt receptive to education.         3.  Questions answered.         4.  Encouraged adequate water intake, walking 30-60 minutes daily, bounce ball, pelvic rocking       5.  GBS today       6.  F/u 1wk       7.  Information and handout given for [employer] .

## 2020-03-17 LAB — GP B STREP DNA SPEC QL NAA+PROBE: NEGATIVE

## 2020-03-26 ENCOUNTER — ROUTINE PRENATAL (OUTPATIENT)
Dept: OBGYN | Facility: CLINIC | Age: 35
End: 2020-03-26

## 2020-03-26 VITALS — SYSTOLIC BLOOD PRESSURE: 108 MMHG | WEIGHT: 187.7 LBS | DIASTOLIC BLOOD PRESSURE: 62 MMHG | BODY MASS INDEX: 32.22 KG/M2

## 2020-03-26 DIAGNOSIS — Z34.83 ENCOUNTER FOR SUPERVISION OF OTHER NORMAL PREGNANCY IN THIRD TRIMESTER: Primary | ICD-10-CM

## 2020-03-26 PROCEDURE — 90040 PR PRENATAL FOLLOW UP: CPT | Performed by: NURSE PRACTITIONER

## 2020-03-26 NOTE — PROGRESS NOTES
OB follow up   + fetal movement.  No VB, LOF  C/o irregular UC's. Wants to get cervical check  Wt:187.7       BP:108/62  Phone # [phone]  Preferred pharmacy confirmed.  GBS negative

## 2020-03-26 NOTE — PROGRESS NOTES
S:  Pt is  at 37w4d here for routine OB follow up.  No c/o today, desires SVE.  Reports good FM.  Denies VB, LOF, RUCs, or vaginal DC.     O:  Please see above vitals.        FHTs: 138        Fundal ht: 36        Fetal position: vertex        SVE: 4-/-2        GBS neg on 3/16/20 -- reviewed w pt.      A:  IUP at 37w4d  Patient Active Problem List    Diagnosis Date Noted   • History of thyroid nodule 2019   • History of macrosomia in infant in prior pregnancy - [id number] x 1 2019   • Advanced maternal age in multigravida 2019   • Encounter for supervision of normal pregnancy in third trimester 2019       P:  1.  Continue FKCs.         2.  Labor precautions given.  Instructions given on where to go.  Pt receptive to education.         3.  Reviewed GBS status w pt.       4.  Questions answered.         5.  Encouraged adequate water intake       6.  F/u 1wk    Chaperone offered and provided by Nkechi You MA.

## 2020-03-27 ENCOUNTER — HOSPITAL ENCOUNTER (INPATIENT)
Facility: MEDICAL CENTER | Age: 35
LOS: 2 days | End: 2020-03-29
Attending: OBSTETRICS & GYNECOLOGY | Admitting: OBSTETRICS & GYNECOLOGY
Payer: MEDICAID

## 2020-03-27 LAB
BASOPHILS # BLD AUTO: 0.3 % (ref 0–1.8)
BASOPHILS # BLD: 0.04 K/UL (ref 0–0.12)
EOSINOPHIL # BLD AUTO: 0.06 K/UL (ref 0–0.51)
EOSINOPHIL NFR BLD: 0.5 % (ref 0–6.9)
ERYTHROCYTE [DISTWIDTH] IN BLOOD BY AUTOMATED COUNT: 46.5 FL (ref 35.9–50)
HCT VFR BLD AUTO: 38.3 % (ref 37–47)
HGB BLD-MCNC: 13 G/DL (ref 12–16)
HOLDING TUBE BB 8507: NORMAL
IMM GRANULOCYTES # BLD AUTO: 0.31 K/UL (ref 0–0.11)
IMM GRANULOCYTES NFR BLD AUTO: 2.7 % (ref 0–0.9)
LYMPHOCYTES # BLD AUTO: 2.45 K/UL (ref 1–4.8)
LYMPHOCYTES NFR BLD: 21.4 % (ref 22–41)
MCH RBC QN AUTO: 30.5 PG (ref 27–33)
MCHC RBC AUTO-ENTMCNC: 33.9 G/DL (ref 33.6–35)
MCV RBC AUTO: 89.9 FL (ref 81.4–97.8)
MONOCYTES # BLD AUTO: 0.92 K/UL (ref 0–0.85)
MONOCYTES NFR BLD AUTO: 8 % (ref 0–13.4)
NEUTROPHILS # BLD AUTO: 7.68 K/UL (ref 2–7.15)
NEUTROPHILS NFR BLD: 67.1 % (ref 44–72)
NRBC # BLD AUTO: 0 K/UL
NRBC BLD-RTO: 0 /100 WBC
PLATELET # BLD AUTO: 221 K/UL (ref 164–446)
PMV BLD AUTO: 11.1 FL (ref 9–12.9)
RBC # BLD AUTO: 4.26 M/UL (ref 4.2–5.4)
WBC # BLD AUTO: 11.5 K/UL (ref 4.8–10.8)

## 2020-03-27 PROCEDURE — 85025 COMPLETE CBC W/AUTO DIFF WBC: CPT

## 2020-03-27 PROCEDURE — 700105 HCHG RX REV CODE 258

## 2020-03-27 PROCEDURE — 770002 HCHG ROOM/CARE - OB PRIVATE (112)

## 2020-03-27 PROCEDURE — 36415 COLL VENOUS BLD VENIPUNCTURE: CPT

## 2020-03-27 RX ORDER — CARBOPROST TROMETHAMINE 250 UG/ML
250 INJECTION, SOLUTION INTRAMUSCULAR
Status: DISCONTINUED | OUTPATIENT
Start: 2020-03-27 | End: 2020-03-28 | Stop reason: HOSPADM

## 2020-03-27 RX ORDER — OXYTOCIN 10 [USP'U]/ML
10 INJECTION, SOLUTION INTRAMUSCULAR; INTRAVENOUS
Status: DISCONTINUED | OUTPATIENT
Start: 2020-03-27 | End: 2020-03-28 | Stop reason: HOSPADM

## 2020-03-27 RX ORDER — CITRIC ACID/SODIUM CITRATE 334-500MG
30 SOLUTION, ORAL ORAL EVERY 6 HOURS PRN
Status: DISCONTINUED | OUTPATIENT
Start: 2020-03-27 | End: 2020-03-28 | Stop reason: HOSPADM

## 2020-03-27 RX ORDER — SODIUM CHLORIDE, SODIUM LACTATE, POTASSIUM CHLORIDE, CALCIUM CHLORIDE 600; 310; 30; 20 MG/100ML; MG/100ML; MG/100ML; MG/100ML
INJECTION, SOLUTION INTRAVENOUS CONTINUOUS
Status: DISPENSED | OUTPATIENT
Start: 2020-03-27 | End: 2020-03-28

## 2020-03-27 RX ORDER — ALUMINA, MAGNESIA, AND SIMETHICONE 2400; 2400; 240 MG/30ML; MG/30ML; MG/30ML
30 SUSPENSION ORAL EVERY 6 HOURS PRN
Status: DISCONTINUED | OUTPATIENT
Start: 2020-03-27 | End: 2020-03-28 | Stop reason: HOSPADM

## 2020-03-27 RX ORDER — HYDROXYZINE 50 MG/1
50 TABLET, FILM COATED ORAL EVERY 6 HOURS PRN
Status: DISCONTINUED | OUTPATIENT
Start: 2020-03-27 | End: 2020-03-28 | Stop reason: HOSPADM

## 2020-03-27 RX ORDER — DEXTROSE, SODIUM CHLORIDE, SODIUM LACTATE, POTASSIUM CHLORIDE, AND CALCIUM CHLORIDE 5; .6; .31; .03; .02 G/100ML; G/100ML; G/100ML; G/100ML; G/100ML
INJECTION, SOLUTION INTRAVENOUS CONTINUOUS
Status: DISCONTINUED | OUTPATIENT
Start: 2020-03-28 | End: 2020-03-29 | Stop reason: HOSPADM

## 2020-03-27 RX ORDER — MISOPROSTOL 200 UG/1
800 TABLET ORAL
Status: DISCONTINUED | OUTPATIENT
Start: 2020-03-27 | End: 2020-03-28 | Stop reason: HOSPADM

## 2020-03-27 RX ORDER — SODIUM CHLORIDE, SODIUM LACTATE, POTASSIUM CHLORIDE, CALCIUM CHLORIDE 600; 310; 30; 20 MG/100ML; MG/100ML; MG/100ML; MG/100ML
INJECTION, SOLUTION INTRAVENOUS
Status: COMPLETED
Start: 2020-03-27 | End: 2020-03-27

## 2020-03-27 RX ORDER — METHYLERGONOVINE MALEATE 0.2 MG/ML
0.2 INJECTION INTRAVENOUS
Status: DISCONTINUED | OUTPATIENT
Start: 2020-03-27 | End: 2020-03-28 | Stop reason: HOSPADM

## 2020-03-27 RX ADMIN — SODIUM CHLORIDE, POTASSIUM CHLORIDE, SODIUM LACTATE AND CALCIUM CHLORIDE: 600; 310; 30; 20 INJECTION, SOLUTION INTRAVENOUS at 23:10

## 2020-03-27 ASSESSMENT — PATIENT HEALTH QUESTIONNAIRE - PHQ9
1. LITTLE INTEREST OR PLEASURE IN DOING THINGS: NOT AT ALL
SUM OF ALL RESPONSES TO PHQ9 QUESTIONS 1 AND 2: 0
2. FEELING DOWN, DEPRESSED, IRRITABLE, OR HOPELESS: NOT AT ALL

## 2020-03-27 ASSESSMENT — LIFESTYLE VARIABLES: EVER_SMOKED: NEVER

## 2020-03-28 ENCOUNTER — ANESTHESIA EVENT (OUTPATIENT)
Dept: ANESTHESIOLOGY | Facility: MEDICAL CENTER | Age: 35
End: 2020-03-28
Payer: MEDICAID

## 2020-03-28 ENCOUNTER — ANESTHESIA (OUTPATIENT)
Dept: ANESTHESIOLOGY | Facility: MEDICAL CENTER | Age: 35
End: 2020-03-28
Payer: MEDICAID

## 2020-03-28 LAB
ERYTHROCYTE [DISTWIDTH] IN BLOOD BY AUTOMATED COUNT: 47 FL (ref 35.9–50)
HCT VFR BLD AUTO: 33.8 % (ref 37–47)
HGB BLD-MCNC: 11.1 G/DL (ref 12–16)
MCH RBC QN AUTO: 29.8 PG (ref 27–33)
MCHC RBC AUTO-ENTMCNC: 32.8 G/DL (ref 33.6–35)
MCV RBC AUTO: 90.9 FL (ref 81.4–97.8)
PLATELET # BLD AUTO: 213 K/UL (ref 164–446)
PMV BLD AUTO: 11.2 FL (ref 9–12.9)
RBC # BLD AUTO: 3.72 M/UL (ref 4.2–5.4)
WBC # BLD AUTO: 13.4 K/UL (ref 4.8–10.8)

## 2020-03-28 PROCEDURE — 700111 HCHG RX REV CODE 636 W/ 250 OVERRIDE (IP): Performed by: ANESTHESIOLOGY

## 2020-03-28 PROCEDURE — 700105 HCHG RX REV CODE 258: Performed by: ANESTHESIOLOGY

## 2020-03-28 PROCEDURE — 700111 HCHG RX REV CODE 636 W/ 250 OVERRIDE (IP): Performed by: NURSE PRACTITIONER

## 2020-03-28 PROCEDURE — 770002 HCHG ROOM/CARE - OB PRIVATE (112)

## 2020-03-28 PROCEDURE — 10907ZC DRAINAGE OF AMNIOTIC FLUID, THERAPEUTIC FROM PRODUCTS OF CONCEPTION, VIA NATURAL OR ARTIFICIAL OPENING: ICD-10-PCS | Performed by: OBSTETRICS & GYNECOLOGY

## 2020-03-28 PROCEDURE — 59409 OBSTETRICAL CARE: CPT | Performed by: OBSTETRICS & GYNECOLOGY

## 2020-03-28 PROCEDURE — A9270 NON-COVERED ITEM OR SERVICE: HCPCS | Performed by: OBSTETRICS & GYNECOLOGY

## 2020-03-28 PROCEDURE — 700101 HCHG RX REV CODE 250: Performed by: ANESTHESIOLOGY

## 2020-03-28 PROCEDURE — 700102 HCHG RX REV CODE 250 W/ 637 OVERRIDE(OP): Performed by: OBSTETRICS & GYNECOLOGY

## 2020-03-28 PROCEDURE — A9270 NON-COVERED ITEM OR SERVICE: HCPCS | Performed by: STUDENT IN AN ORGANIZED HEALTH CARE EDUCATION/TRAINING PROGRAM

## 2020-03-28 PROCEDURE — 700105 HCHG RX REV CODE 258: Performed by: NURSE PRACTITIONER

## 2020-03-28 PROCEDURE — 85027 COMPLETE CBC AUTOMATED: CPT

## 2020-03-28 PROCEDURE — 700111 HCHG RX REV CODE 636 W/ 250 OVERRIDE (IP)

## 2020-03-28 PROCEDURE — 700102 HCHG RX REV CODE 250 W/ 637 OVERRIDE(OP): Performed by: STUDENT IN AN ORGANIZED HEALTH CARE EDUCATION/TRAINING PROGRAM

## 2020-03-28 PROCEDURE — 700112 HCHG RX REV CODE 229: Performed by: STUDENT IN AN ORGANIZED HEALTH CARE EDUCATION/TRAINING PROGRAM

## 2020-03-28 PROCEDURE — 303615 HCHG EPIDURAL/SPINAL ANESTHESIA FOR LABOR

## 2020-03-28 PROCEDURE — 59409 OBSTETRICAL CARE: CPT

## 2020-03-28 PROCEDURE — 36415 COLL VENOUS BLD VENIPUNCTURE: CPT

## 2020-03-28 PROCEDURE — 304965 HCHG RECOVERY SERVICES

## 2020-03-28 RX ORDER — ROPIVACAINE HYDROCHLORIDE 2 MG/ML
INJECTION, SOLUTION EPIDURAL; INFILTRATION; PERINEURAL CONTINUOUS
Status: DISCONTINUED | OUTPATIENT
Start: 2020-03-28 | End: 2020-03-29 | Stop reason: HOSPADM

## 2020-03-28 RX ORDER — ACETAMINOPHEN 325 MG/1
325 TABLET ORAL EVERY 4 HOURS PRN
Status: DISCONTINUED | OUTPATIENT
Start: 2020-03-28 | End: 2020-03-29 | Stop reason: HOSPADM

## 2020-03-28 RX ORDER — ACETAMINOPHEN 325 MG/1
650 TABLET ORAL EVERY 4 HOURS PRN
Status: DISCONTINUED | OUTPATIENT
Start: 2020-03-28 | End: 2020-03-28

## 2020-03-28 RX ORDER — HYDROCODONE BITARTRATE AND ACETAMINOPHEN 10; 325 MG/1; MG/1
1 TABLET ORAL EVERY 4 HOURS PRN
Status: DISCONTINUED | OUTPATIENT
Start: 2020-03-28 | End: 2020-03-29 | Stop reason: HOSPADM

## 2020-03-28 RX ORDER — SODIUM CHLORIDE, SODIUM LACTATE, POTASSIUM CHLORIDE, AND CALCIUM CHLORIDE .6; .31; .03; .02 G/100ML; G/100ML; G/100ML; G/100ML
1000 INJECTION, SOLUTION INTRAVENOUS
Status: COMPLETED | OUTPATIENT
Start: 2020-03-28 | End: 2020-03-28

## 2020-03-28 RX ORDER — LIDOCAINE HYDROCHLORIDE 10 MG/ML
INJECTION, SOLUTION INFILTRATION; PERINEURAL
Status: COMPLETED
Start: 2020-03-28 | End: 2020-03-28

## 2020-03-28 RX ORDER — IBUPROFEN 800 MG/1
800 TABLET ORAL EVERY 8 HOURS PRN
Status: DISCONTINUED | OUTPATIENT
Start: 2020-03-28 | End: 2020-03-28

## 2020-03-28 RX ORDER — SODIUM CHLORIDE, SODIUM LACTATE, POTASSIUM CHLORIDE, AND CALCIUM CHLORIDE .6; .31; .03; .02 G/100ML; G/100ML; G/100ML; G/100ML
250 INJECTION, SOLUTION INTRAVENOUS PRN
Status: DISCONTINUED | OUTPATIENT
Start: 2020-03-28 | End: 2020-03-28 | Stop reason: HOSPADM

## 2020-03-28 RX ORDER — METOCLOPRAMIDE HYDROCHLORIDE 5 MG/ML
10 INJECTION INTRAMUSCULAR; INTRAVENOUS EVERY 6 HOURS PRN
Status: DISCONTINUED | OUTPATIENT
Start: 2020-03-28 | End: 2020-03-29 | Stop reason: HOSPADM

## 2020-03-28 RX ORDER — ONDANSETRON 2 MG/ML
4 INJECTION INTRAMUSCULAR; INTRAVENOUS EVERY 6 HOURS PRN
Status: DISCONTINUED | OUTPATIENT
Start: 2020-03-28 | End: 2020-03-29 | Stop reason: HOSPADM

## 2020-03-28 RX ORDER — IBUPROFEN 600 MG/1
600 TABLET ORAL EVERY 6 HOURS
Status: DISCONTINUED | OUTPATIENT
Start: 2020-03-28 | End: 2020-03-29 | Stop reason: HOSPADM

## 2020-03-28 RX ORDER — DOCUSATE SODIUM 100 MG/1
100 CAPSULE, LIQUID FILLED ORAL 2 TIMES DAILY PRN
Status: DISCONTINUED | OUTPATIENT
Start: 2020-03-28 | End: 2020-03-29 | Stop reason: HOSPADM

## 2020-03-28 RX ORDER — ROPIVACAINE HYDROCHLORIDE 2 MG/ML
INJECTION, SOLUTION EPIDURAL; INFILTRATION; PERINEURAL
Status: COMPLETED
Start: 2020-03-28 | End: 2020-03-28

## 2020-03-28 RX ORDER — MISOPROSTOL 200 UG/1
600 TABLET ORAL
Status: DISCONTINUED | OUTPATIENT
Start: 2020-03-28 | End: 2020-03-29 | Stop reason: HOSPADM

## 2020-03-28 RX ORDER — HYDROCODONE BITARTRATE AND ACETAMINOPHEN 5; 325 MG/1; MG/1
1 TABLET ORAL EVERY 4 HOURS PRN
Status: DISCONTINUED | OUTPATIENT
Start: 2020-03-28 | End: 2020-03-29 | Stop reason: HOSPADM

## 2020-03-28 RX ORDER — MISOPROSTOL 200 UG/1
TABLET ORAL
Status: COMPLETED
Start: 2020-03-28 | End: 2020-03-28

## 2020-03-28 RX ORDER — ONDANSETRON 4 MG/1
4 TABLET, ORALLY DISINTEGRATING ORAL EVERY 6 HOURS PRN
Status: DISCONTINUED | OUTPATIENT
Start: 2020-03-28 | End: 2020-03-29 | Stop reason: HOSPADM

## 2020-03-28 RX ORDER — ACETAMINOPHEN 325 MG/1
650 TABLET ORAL EVERY 4 HOURS PRN
Status: DISCONTINUED | OUTPATIENT
Start: 2020-03-28 | End: 2020-03-29 | Stop reason: HOSPADM

## 2020-03-28 RX ORDER — ROPIVACAINE HYDROCHLORIDE 2 MG/ML
INJECTION, SOLUTION EPIDURAL; INFILTRATION
Status: COMPLETED | OUTPATIENT
Start: 2020-03-28 | End: 2020-03-28

## 2020-03-28 RX ORDER — SODIUM CHLORIDE, SODIUM LACTATE, POTASSIUM CHLORIDE, CALCIUM CHLORIDE 600; 310; 30; 20 MG/100ML; MG/100ML; MG/100ML; MG/100ML
INJECTION, SOLUTION INTRAVENOUS PRN
Status: DISCONTINUED | OUTPATIENT
Start: 2020-03-28 | End: 2020-03-29 | Stop reason: HOSPADM

## 2020-03-28 RX ORDER — LIDOCAINE HYDROCHLORIDE AND EPINEPHRINE 15; 5 MG/ML; UG/ML
INJECTION, SOLUTION EPIDURAL
Status: COMPLETED | OUTPATIENT
Start: 2020-03-28 | End: 2020-03-28

## 2020-03-28 RX ADMIN — OXYTOCIN 2 MILLI-UNITS/MIN: 10 INJECTION, SOLUTION INTRAMUSCULAR; INTRAVENOUS at 05:22

## 2020-03-28 RX ADMIN — LIDOCAINE HYDROCHLORIDE,EPINEPHRINE BITARTRATE 3 ML: 15; .005 INJECTION, SOLUTION EPIDURAL; INFILTRATION; INTRACAUDAL; PERINEURAL at 09:08

## 2020-03-28 RX ADMIN — IBUPROFEN 600 MG: 600 TABLET ORAL at 14:33

## 2020-03-28 RX ADMIN — OXYTOCIN 125 ML/HR: 10 INJECTION, SOLUTION INTRAMUSCULAR; INTRAVENOUS at 13:12

## 2020-03-28 RX ADMIN — ROPIVACAINE HYDROCHLORIDE: 2 INJECTION, SOLUTION EPIDURAL; INFILTRATION; PERINEURAL at 09:28

## 2020-03-28 RX ADMIN — SODIUM CHLORIDE, SODIUM LACTATE, POTASSIUM CHLORIDE, CALCIUM CHLORIDE AND DEXTROSE MONOHYDRATE: 5; 600; 310; 30; 20 INJECTION, SOLUTION INTRAVENOUS at 10:33

## 2020-03-28 RX ADMIN — ACETAMINOPHEN 650 MG: 325 TABLET, FILM COATED ORAL at 11:27

## 2020-03-28 RX ADMIN — SODIUM CHLORIDE, POTASSIUM CHLORIDE, SODIUM LACTATE AND CALCIUM CHLORIDE 1000 ML: 600; 310; 30; 20 INJECTION, SOLUTION INTRAVENOUS at 09:33

## 2020-03-28 RX ADMIN — IBUPROFEN 600 MG: 600 TABLET ORAL at 20:23

## 2020-03-28 RX ADMIN — ROPIVACAINE HYDROCHLORIDE: 2 INJECTION, SOLUTION EPIDURAL; INFILTRATION at 09:28

## 2020-03-28 RX ADMIN — SODIUM CHLORIDE, POTASSIUM CHLORIDE, SODIUM LACTATE AND CALCIUM CHLORIDE: 600; 310; 30; 20 INJECTION, SOLUTION INTRAVENOUS at 05:22

## 2020-03-28 RX ADMIN — DOCUSATE SODIUM 100 MG: 100 CAPSULE, LIQUID FILLED ORAL at 20:23

## 2020-03-28 RX ADMIN — FENTANYL CITRATE 100 MCG: 50 INJECTION, SOLUTION INTRAMUSCULAR; INTRAVENOUS at 07:05

## 2020-03-28 RX ADMIN — FENTANYL CITRATE 100 MCG: 50 INJECTION, SOLUTION INTRAMUSCULAR; INTRAVENOUS at 08:19

## 2020-03-28 RX ADMIN — ROPIVACAINE HYDROCHLORIDE 7 ML: 2 INJECTION, SOLUTION EPIDURAL; INFILTRATION at 09:08

## 2020-03-28 ASSESSMENT — LIFESTYLE VARIABLES
EVER HAD A DRINK FIRST THING IN THE MORNING TO STEADY YOUR NERVES TO GET RID OF A HANGOVER: NO
EVER FELT BAD OR GUILTY ABOUT YOUR DRINKING: NO
HAVE PEOPLE ANNOYED YOU BY CRITICIZING YOUR DRINKING: NO
TOTAL SCORE: 0
TOTAL SCORE: 0
HAVE YOU EVER FELT YOU SHOULD CUT DOWN ON YOUR DRINKING: NO
TOTAL SCORE: 0
ALCOHOL_USE: NO
CONSUMPTION TOTAL: INCOMPLETE

## 2020-03-28 ASSESSMENT — COPD QUESTIONNAIRES
COPD SCREENING SCORE: 0
DO YOU EVER COUGH UP ANY MUCUS OR PHLEGM?: NO/ONLY WITH OCCASIONAL COLDS OR INFECTIONS
DURING THE PAST 4 WEEKS HOW MUCH DID YOU FEEL SHORT OF BREATH: NONE/LITTLE OF THE TIME
HAVE YOU SMOKED AT LEAST 100 CIGARETTES IN YOUR ENTIRE LIFE: NO/DON'T KNOW
IN THE PAST 12 MONTHS DO YOU DO LESS THAN YOU USED TO BECAUSE OF YOUR BREATHING PROBLEMS: DISAGREE/UNSURE

## 2020-03-28 ASSESSMENT — PAIN SCALES - GENERAL: PAIN_LEVEL: 0

## 2020-03-28 NOTE — PROGRESS NOTES
S: Pt is feeling more uncomfortable c UCs.  Would like AROM.    O:    Vitals:    03/28/20 0109 03/28/20 0410 03/28/20 0530 03/28/20 0623   BP: 107/57 (!) 94/55 117/65    Pulse: 88 88 80    Temp: 36.2 °C (97.1 °F) 36.7 °C (98 °F)  35.8 °C (96.5 °F)   TempSrc: Oral Temporal  Temporal   Weight:       Height:               FHTs:  Baseline 135, + accels, - decels, mod variability        [place]: 3 contractions in 10 minutes, mod to palpation, pitocin @ 6 milliunits        SVE: 6-7/70/-2         AROM for clear fluid    A/P:    1.  IUP @ 37w6d - active labor  2.  Cat 1 FHTs    3.  Continue pitocin per protocol.    4.  Pain management prn.    Randi Gallo, CNM, APN  Dr. Poe -- attending physician

## 2020-03-28 NOTE — ANESTHESIA QCDR
2019 [facility] Clinical Data Registry (for Quality Improvement)     Postoperative nausea/vomiting risk protocol (Adult = 18 yrs and Pediatric 3-17 yrs)- (430 and 463)  General inhalation anesthetic (NOT TIVA) with PONV risk factors: No  Provision of anti-emetic therapy with at least 2 different classes of agents: N/A  Patient DID NOT receive anti-emetic therapy and reason is documented in Medical Record: N/A    Multimodal Pain Management- (477)  Non-emergent surgery AND patient age >= 18: No  Use of Multimodal Pain Management, two or more drugs and/or interventions, NOT including systemic opioids:   Exception: Documented allergy to multiple classes of analgesics:     Smoking Abstinence (404)  Patient is current smoker (cigarette, pipe, e-cig, marijuanna): No  Elective Surgery:   Abstinence instructions provided prior to day of surgery:   Patient abstained from smoking on day of surgery:     Pre-Op Beta-Blocker in Isolated CABG (44)  Isolated CABG AND patient age >= 18: No  Beta-blocker admin within 24 hours of surgical incision:   Exception:of medical reason(s) for not administering beta blocker within 24 hours prior to surgical incision (e.g., not  indicated,other medical reason):     PACU assessment of acute postoperative pain prior to Anesthesia Care End- Applies to Patients Age = 18- (ABG7)  Initial PACU pain score is which of the following: < 7/10  Patient unable to report pain score: N/A    Post-anesthetic transfer of care checklist/protocol to PACU/ICU- (426 and 427)  Upon conclusion of case, patient transferred to which of the following locations: PACU/Non-ICU  Use of transfer checklist/protocol: No  Exclusion: Service Performed in Patient Hospital Room (and thus did not require transfer): Yes   Unplanned admission to ICU related to anesthesia service up through end of PACU care- (MD51)  Unplanned admission to ICU (not initially anticipated at anesthesia start time): No

## 2020-03-28 NOTE — PROGRESS NOTES
07- Received report from OWEN Urrutia RN.  Pt breathing through UC's, fentanyl given per pt request (See MAR).  FOB at bs.    0810- SVE- /-2.  Pt assisted up to bathroom.  0905- Pt requesting an epidural.  SVE- no change.  IV fluid bolus started.  0908- Dr. Trujillo at bs.  Epidural placed, pt tolerated well.  1150- Dr. Leavitt at bs.  SVE- complete.  Started pushing.  1203-  of a viable male, 7/8 apgars.  1213- Spontaneous delivery of intact placenta.  1450- Report to ROMIE Beauchamp RN.

## 2020-03-28 NOTE — ANESTHESIA POSTPROCEDURE EVALUATION
Patient: Patricia Larson    Procedure Summary     Date:  03/28/20 Room / Location:      Anesthesia Start:  0908 Anesthesia Stop:  1203    Procedure:  Labor Epidural Diagnosis:      Scheduled Providers:   Responsible Provider:  Esther Trujillo M.D.    Anesthesia Type:  epidural ASA Status:  2          Final Anesthesia Type: epidural  Last vitals  BP   Blood Pressure: 104/50    Temp   36.6 °C (97.8 °F)    Pulse   Pulse: 93   Resp        SpO2          Anesthesia Post Evaluation    Patient location during evaluation: bedside  Patient participation: complete - patient participated  Level of consciousness: awake and alert  Pain score: 0    Airway patency: patent  Anesthetic complications: no  Cardiovascular status: adequate and hemodynamically stable  Respiratory status: acceptable  Hydration status: acceptable    PONV: none           Nurse Pain Score: 4 (NPRS)

## 2020-03-28 NOTE — PROGRESS NOTES
1525 Assumed care from labor and delivery. In Portuguese, oriented patient to room, call light, emergency light, TV, bed remote. Assessment completed, fundus firm, lochia light. Plan of care reviewed, verbalized understanding. Patient denies pain at this time, will call if pain med intervention needed.

## 2020-03-28 NOTE — ANESTHESIA PROCEDURE NOTES
Epidural Block  Date/Time: 3/28/2020 9:08 AM  Performed by: Esther Trujillo M.D.  Authorized by: Esther Trujillo M.D.     Patient Location:  OB  Start Time:  3/28/2020 9:08 AM  End Time:  3/28/2020 9:18 AM  Reason for Block: at surgeon's request, post-op pain management, primary anesthetic and labor analgesia    patient identified, IV checked, site marked, risks and benefits discussed, surgical consent, monitors and equipment checked, pre-op evaluation, timeout performed and at patient's request    Patient Position:  Sitting  Prep: ChloraPrep    Monitoring:  Blood pressure  Approach:  Midline  Location:  L2-L3  Injection Technique:  MARTHA air and MARTHA saline  Skin infiltration:  Lidocaine  Strength:  1%  Dose:  3ml  Needle Type:  Tuohy  Needle Gauge:  17 G  Needle Length:  3.5 in  Loss of resistance::  5  Catheter Size:  19 G  Catheter at Skin Depth:  15  Test Dose Result:  Negative

## 2020-03-28 NOTE — ANESTHESIA TIME REPORT
Anesthesia Start and Stop Event Times     Date Time Event    3/28/2020 0908 Ready for Procedure     0908 Anesthesia Start     1203 Anesthesia Stop        Responsible Staff  03/28/20    Name Role Begin End    Esther Trujillo M.D. Anesth 0908 1203        Preop Diagnosis (Free Text):  Pre-op Diagnosis             Preop Diagnosis (Codes):    Post op Diagnosis  Pregnancy      Premium Reason  E. Weekend    Comments:

## 2020-03-28 NOTE — CARE PLAN
Pt is Albanian-speaking only. RN is Qualified . Pt's questions answered, highly encouraged to call out regarding any questions/concerns/ needs.   Problem: Communication  Goal: The ability to communicate needs accurately and effectively will improve  Outcome: PROGRESSING AS EXPECTED     Pt family encouraged to report any spills, loose cords. TOCO and EFM are on pt to monitor baby. IV fluids running as ordered. Educated on use of call light. Call light available at bedside.    Problem: Safety  Goal: Will remain free from injury  Outcome: PROGRESSING AS EXPECTED

## 2020-03-28 NOTE — PROGRESS NOTES
"Pt is a  pt of [facility] , 37.5wks presenting to L&D with c/o of UC's q5-6 min since  this PM. Pt is German-speaking only, interpretation done by RN who is Qualified . States decreased FM as well, states \"baby is not moving as usual\" and last movement felt was at 1900. Denies LOF, or VB. Denies complications with this pregnancy. Denies fever, Ha, Chills or nausea. SVE by RN, see flowsheets.    Report given to A Hindu CNM, orders received to admit the pt.   Pt and FOB educated on admission procedures, questions answered. Agree to proceed.   Orders received by DO to have pt augmented with pitocin d/t ineffective contraction pattern.   0630- A Hindu at bedside for AROM. Pt and s/o educated and agree to proceed.   0700- Report given to VIKAS Beaver RN, POC discussed. Questions answered.   "

## 2020-03-28 NOTE — L&D DELIVERY NOTE
OB Vaginal Delivery Note    3/28/2020  Patricia Larson  34 y.o.    Patient was admitted to Labor and Delivery at 37w6d for active labor    Review the Delivery Report for details.     Gestational Age: 37w6d  /Para:   Labor Complications: None   Blood Loss:   IO Blood Loss  20 - 20 1231    None          Delivery Type:     ROM to Delivery Time: 5h 33m   Sex: Male   Weight:      1 Minute 5 Minute 10 Minute   Apgar Totals: 7    8            Delivery Details:  Patricia Larson, marge 34 y.o.  female delivered a viable Male infant with Apgars of 7   and 8  . Patient was fully dilated and pushing after    hours    minutes in active labor. The patient was put in the dorsal lithotomy position. Delivery was via    to a sterile field under    anesthesia. Infant delivered in             position. Anterior and posterior shoulders delivered without difficulty.    The cord was clamped, cut twice and    were noted. No cord complications. Cord blood was obtained in routine fashion with the following disposition:   . Intact  placenta delivered at 3/28/2020 12:13 PM . Placental disposition: discarded . Fundal massage performed and fundus found to be firm. Perineum, vagina, cervix were inspected, and no lacerations were noted.    Infant and patient in delivery room in good and stable condition.   Josue Weaver M.D.

## 2020-03-28 NOTE — ANESTHESIA PREPROCEDURE EVALUATION
Relevant Problems   NEURO   (+) History of macrosomia in infant in prior pregnancy - [id number] x 1   (+) History of thyroid nodule       Physical Exam    Airway   Mallampati: III       Cardiovascular   Rhythm: regular  Rate: normal     Dental - normal exam         Pulmonary    Abdominal - normal exam     Neurological              Anesthesia Plan    ASA 2       Plan - epidural   Neuraxial block will be labor analgesia              Pertinent diagnostic labs and testing reviewed    Informed Consent:    Anesthetic plan and risks discussed with patient and spouse.    Use of blood products discussed with: patient and spouse whom.

## 2020-03-28 NOTE — H&P
History and Physical      Patricia Larson is a 34 y.o. year old female  at 37w5d who presents for RUCs since today.    Subjective:   positive  For CTXS.   positive Feels pain   negative for LOF  negative for vaginal bleeding.   positive for fetal movement, but has been somewhat decreased    ROS: Pertinent items are noted in HPI.    No past medical history on file.  No past surgical history on file.  OB History    Para Term  AB Living   5 4 4 0 0 4   SAB TAB Ectopic Molar Multiple Live Births   0 0 0     4      # Outcome Date GA Lbr Rick/2nd Weight Sex Delivery Anes PTL Lv   5 Current            4 Term 11/10/16 40w3d  4.035 kg (8 lb 14.3 oz) F Vag-Spont None N JESUS      Birth Comments: IOL. Pt states no complications   3 Term 11 40w2d  3.799 kg (8 lb 6 oz) F Vag-Spont None N JESUS      Birth Comments: No complications   2 Term 05 40w0d  3.175 kg (7 lb) F Vag-Spont None N JESUS      Birth Comments: no complications.   1 Term 03 40w0d  3.572 kg (7 lb 14 oz) F Vag-Spont None N JESUS      Birth Comments: no complications.     Social History     Socioeconomic History   • Marital status: Single     Spouse name: Not on file   • Number of children: Not on file   • Years of education: Not on file   • Highest education level: Not on file   Occupational History   • Not on file   Social Needs   • Financial resource strain: Not on file   • Food insecurity     Worry: Not on file     Inability: Not on file   • Transportation needs     Medical: Not on file     Non-medical: Not on file   Tobacco Use   • Smoking status: Never Smoker   • Smokeless tobacco: Never Used   Substance and Sexual Activity   • Alcohol use: No     Comment: socially; none since pregnant   • Drug use: No   • Sexual activity: Not Currently     Partners: Male     Birth control/protection: I.U.D.     Comment: Unplanned pregnancy   Lifestyle   • Physical activity     Days per week: Not on file     Minutes per session: Not on file  "  • Stress: Not on file   Relationships   • Social connections     Talks on phone: Not on file     Gets together: Not on file     Attends Hoahaoism service: Not on file     Active member of club or organization: Not on file     Attends meetings of clubs or organizations: Not on file     Relationship status: Not on file   • Intimate partner violence     Fear of current or ex partner: Not on file     Emotionally abused: Not on file     Physically abused: Not on file     Forced sexual activity: Not on file   Other Topics Concern   • Not on file   Social History Narrative    ** Merged History Encounter **          Allergies: Patient has no known allergies.  No current facility-administered medications for this encounter.     Prenatal care with [facility] starting at 11wks with following problems:  Patient Active Problem List    Diagnosis Date Noted   • History of thyroid nodule 12/16/2019   • History of macrosomia in infant in prior pregnancy - [id number] x 1 12/16/2019   • Advanced maternal age in multigravida 12/16/2019   • Encounter for supervision of normal pregnancy in third trimester 09/23/2019         Objective:      /81   Temp 37.3 °C (99.2 °F) (Temporal)   Ht 1.626 m (5' 4\")   Wt 85.3 kg (188 lb)     General:   no acute distress, alert, cooperative   Skin:   normal   HEENT:  PERRLA   Lungs:   CTA bilateral   Heart:   S1, S2 normal, no murmur, click, rub or gallop, regular rate and rhythm, brisk carotid upstroke without bruits, peripheral pulses very brisk, chest is clear without rales or wheezing, no pedal edema, no JVD, no hepatosplenomegaly   Abdomen:   gravid, NT   EFW:  2800 g   Pelvis:  Exam deferred., proven to 4000 g   FHTs: 150 BPM + accels - decels mod variability   Contractions: 2 contractions in 10 min moderate to palpation   Uterine Size: S=D   Presentations: Cephalic per RN   Cervix: Per RN    Dilation: 5    Effacement: 70    Station:  -2    Consistency: Medium    Position: Middle     Complete OB " US  11/25/2019 8:33 AM     HISTORY/REASON FOR EXAM:  Evaluate fetal anatomy.     TECHNIQUE/EXAM DESCRIPTION: OB complete ultrasound.     COMPARISON:  None     FINDINGS:  Fetal Lie:  Breech  LMP:  Not applicable  Clinical EL by LMP:  04/12/2020     Placenta (Location):  Anterior  Placenta Previa: No  Placental Grade: I     Amniotic Fluid Volume:  ALLEY = 15.57 cm     Fetal Heart Rate:  146 bpm     Cervical Length:  3.41 cm transabdominal     No maternal adnexal mass is identified.     Umbilical Artery S/D Ratio(s):  Not applicable     Fetal Anatomy  (Seen or Not Seen)  Lateral Ventricles     Seen  Cisterna Magna        Seen  Cerebellum              Seen  CSP             Seen  Orbits             Seen  Face/Lips                Seen  Cord Insertion         Seen  Placental CI         Seen  4 Chamber Heart     Seen  LVOT               Seen  RVOT              Seen  3 Vessel View     Seen  Stomach       Seen  Kidneys                   Seen  Urinary Bladder      Seen  Spine                       Seen  3 Vessel Cord          Seen  Both Upper Extremities    Seen  Both Lower Extremities    Seen  Diaphragm             Seen  Movement       Seen  Gender:  Male     Fetal Biometry  BPD    4.60 cm, 19 weeks 6 days  HC    18.67 cm, 21 weeks 0 days  AC    15.69 cm, 20 weeks 6 days  Femur Length    3.43 cm, 20 weeks 6 days  Humerus Length    3.63 cm, 22 weeks 5 days  Cerebellum Diameter   2.28 cm     EGA by this US:  21 weeks 0 days  EL by this US: 04/06/2020  EL by 1st US:  04/12/2020     Estimated Fetal Weight:  378 grams  EFW Percentile: 64.6%     Comments:     IMPRESSION:     1.  Single intrauterine pregnancy of an estimated gestational age of 21 weeks 0 days with an estimated date of delivery of 04/06/2020.  2.  Fetal survey within normal limits.    Lab Review  Lab:   Blood type: O     Recent Results (from the past 5880 hour(s))   POCT Urinalysis    Collection Time: 09/23/19  7:33 AM   Result Value Ref Range    POC Color      POC  Appearance      POC Leukocyte Esterase small Negative    POC Nitrites positive Negative    POC Urobiligen      POC Protein trace Negative mg/dL    POC Urine PH 7.0 5.0 - 8.0    POC Blood trace-intact Negative    POC Specific Gravity 1.020 <1.005 - >1.030    POC Ketones negative Negative mg/dL    POC Bilirubin      POC Glucose negative Negative mg/dL   THINPREP RFLX HPV ASCUS W/CTNG    Collection Time: 09/23/19  8:43 AM   Result Value Ref Range    Cytology Reg See Path Report     C. trachomatis by PCR Negative Negative    N. gonorrhoeae by PCR Negative Negative    Source Cervical    AFP TETRA    Collection Time: 10/22/19  5:07 PM   Result Value Ref Range    AFP Value -Eia 43 ng/mL    AFP MOM Value 1.52     Ue3 Value 0.95 ng/mL    Ue3 Mom 1.22     Patient's hCG, 2nd Trimester 01035 IU/L    hCG MoM, 2nd Trimester 1.11     Liss Value -Eia 292 pg/mL    Liss Mom Value 1.63     Interpretation Screen Neg     M[age] at EL 35.8 yr    Maternal Weight 159.0 lbs.     Gest. Age on Collection Date 15 wks, 2 days     Gestational Age Based On Other     Multiple Pregnancy Canales     Race Nonblack     Insulin Dependent Diabetes No     Smoking No     Family Hx NTD No     Family Hx of Aneuploidy No     Specimen See Note     EER Quad, Maternal Serum See Note    URINE DRUG SCREEN W/CONF (AR)    Collection Time: 10/22/19  5:07 PM   Result Value Ref Range    Urine Amphetamine-Methamphetam Negative Cutoff 300 ng/mL    Barbiturates Negative Cutoff 200 ng/mL    Benzodiazepines Negative Cutoff 200 ng/mL    Propoxyphene Negative Cutoff 300 ng/mL    Cocaine Metabolite Negative Cutoff 150 ng/mL    Methadone Negative Cutoff 150 ng/mL    Codeine-Morphine Negative Cutoff 300 ng/mL    Phencyclidine -Pcp Negative Cutoff 25 ng/mL    Cannabinoid Metab Negative Cutoff 20 ng/mL    Drug Comment Urine Drugs See Note    PREG CNTR PRENATAL PN    Collection Time: 10/22/19  5:07 PM   Result Value Ref Range    Color Yellow     Character Clear     Specific  Gravity 1.009 <1.035    Ph 6.0 5.0 - 8.0    Glucose Negative Negative mg/dL    Ketones Negative Negative mg/dL    Protein Negative Negative mg/dL    Bilirubin Negative Negative    Urobilinogen, Urine 0.2 Negative    Nitrite Negative Negative    Leukocyte Esterase Negative Negative    Occult Blood Negative Negative    Micro Urine Req see below     WBC 10.3 4.8 - 10.8 K/uL    RBC 4.31 4.20 - 5.40 M/uL    Hemoglobin 13.1 12.0 - 16.0 g/dL    Hematocrit 40.6 37.0 - 47.0 %    MCV 94.2 81.4 - 97.8 fL    MCH 30.4 27.0 - 33.0 pg    MCHC 32.3 (L) 33.6 - 35.0 g/dL    RDW 46.2 35.9 - 50.0 fL    Platelet Count 247 164 - 446 K/uL    MPV 11.5 9.0 - 12.9 fL    Neutrophils-Polys 70.40 44.00 - 72.00 %    Lymphocytes 22.80 22.00 - 41.00 %    Monocytes 5.80 0.00 - 13.40 %    Eosinophils 0.20 0.00 - 6.90 %    Basophils 0.20 0.00 - 1.80 %    Immature Granulocytes 0.60 0.00 - 0.90 %    Nucleated RBC 0.00 /100 WBC    Neutrophils (Absolute) 7.28 (H) 2.00 - 7.15 K/uL    Lymphs (Absolute) 2.36 1.00 - 4.80 K/uL    Monos (Absolute) 0.60 0.00 - 0.85 K/uL    Eos (Absolute) 0.02 0.00 - 0.51 K/uL    Baso (Absolute) 0.02 0.00 - 0.12 K/uL    Immature Granulocytes (abs) 0.06 0.00 - 0.11 K/uL    NRBC (Absolute) 0.00 K/uL    Hepatitis B Surface Antigen Negative Negative    Rubella IgG Antibody 141.00 IU/mL    Syphilis, Treponemal Qual Non Reactive Non Reactive   HIV AG/AB COMBO ASSAY SCREENING    Collection Time: 10/22/19  5:07 PM   Result Value Ref Range    HIV Ag/Ab Combo Assay Non Reactive Non Reactive   OP Prenatal Panel-Blood Bank    Collection Time: 10/22/19  5:07 PM   Result Value Ref Range    ABO Grouping Only O     Rh Grouping Only POS     Antibody Screen Scrn NEG    POCT Urinalysis    Collection Time: 11/18/19  8:55 AM   Result Value Ref Range    POC Color Yellow Negative    POC Appearance Cloudy Negative    POC Leukocyte Esterase Small Negative    POC Nitrites Negative Negative    POC Urobiligen n/a Negative (0.2) mg/dL    POC Protein  Negative Negative mg/dL    POC Urine PH 6.5 5.0 - 8.0    POC Blood Trace-intact Negative    POC Specific Gravity 1.020 <1.005 - >1.030    POC Ketones Negative Negative mg/dL    POC Bilirubin n/a Negative mg/dL    POC Glucose Negative Negative mg/dL   TSH WITH REFLEX TO FT4    Collection Time: 01/06/20  5:32 PM   Result Value Ref Range    TSH 1.040 0.380 - 5.330 uIU/mL   T.PALLIDUM AB EIA    Collection Time: 01/06/20  5:32 PM   Result Value Ref Range    Syphilis, Treponemal Qual Non Reactive Non Reactive   GLUCOSE 1HR GESTATIONAL    Collection Time: 01/06/20  5:32 PM   Result Value Ref Range    Glucose, Post Dose 81 70 - 139 mg/dL   CBC WITHOUT DIFFERENTIAL    Collection Time: 01/06/20  5:32 PM   Result Value Ref Range    WBC 10.4 4.8 - 10.8 K/uL    RBC 4.03 (L) 4.20 - 5.40 M/uL    Hemoglobin 12.6 12.0 - 16.0 g/dL    Hematocrit 38.9 37.0 - 47.0 %    MCV 96.5 81.4 - 97.8 fL    MCH 31.3 27.0 - 33.0 pg    MCHC 32.4 (L) 33.6 - 35.0 g/dL    RDW 48.1 35.9 - 50.0 fL    Platelet Count 197 164 - 446 K/uL    MPV 11.3 9.0 - 12.9 fL   POCT Urinalysis    Collection Time: 02/24/20  9:15 AM   Result Value Ref Range    POC Color Damaris Negative    POC Appearance Cloudy Negative    POC Leukocyte Esterase Large Negative    POC Nitrites Positive Negative    POC Urobiligen n/a Negative (0.2) mg/dL    POC Protein 100 Negative mg/dL    POC Urine PH 6.0 5.0 - 8.0    POC Blood Small Negative    POC Specific Gravity 1.025 <1.005 - >1.030    POC Ketones Trace Negative mg/dL    POC Bilirubin n/a Negative mg/dL    POC Glucose Negative Negative mg/dL   GRP B STREP, BY PCR (RAMSEY BROTH)    Collection Time: 03/16/20  1:25 PM   Result Value Ref Range    Strep Gp B DNA PCR Negative Negative        Assessment:   1.  IUP at 37w5d  2.  Labor status: Active phase labor.  3.  Cat 1 FHTs  4.  Obstetrical history significant for   Patient Active Problem List    Diagnosis Date Noted   • History of thyroid nodule 12/16/2019   • History of macrosomia in infant  in prior pregnancy - [id number] x 1 12/16/2019   • Advanced maternal age in multigravida 12/16/2019   • Encounter for supervision of normal pregnancy in third trimester 09/23/2019   .      Plan:     Admit to L&D  GBS negative  Routine labs  Pain management prn - pt desires IV pain medication.

## 2020-03-29 VITALS
HEIGHT: 64 IN | OXYGEN SATURATION: 94 % | HEART RATE: 94 BPM | DIASTOLIC BLOOD PRESSURE: 77 MMHG | TEMPERATURE: 97.7 F | RESPIRATION RATE: 18 BRPM | SYSTOLIC BLOOD PRESSURE: 132 MMHG | BODY MASS INDEX: 32.1 KG/M2 | WEIGHT: 188 LBS

## 2020-03-29 PROCEDURE — 700102 HCHG RX REV CODE 250 W/ 637 OVERRIDE(OP): Performed by: STUDENT IN AN ORGANIZED HEALTH CARE EDUCATION/TRAINING PROGRAM

## 2020-03-29 PROCEDURE — A9270 NON-COVERED ITEM OR SERVICE: HCPCS | Performed by: STUDENT IN AN ORGANIZED HEALTH CARE EDUCATION/TRAINING PROGRAM

## 2020-03-29 RX ORDER — IBUPROFEN 600 MG/1
600 TABLET ORAL EVERY 6 HOURS
Qty: 30 TAB | Refills: 1 | Status: SHIPPED | OUTPATIENT
Start: 2020-03-29 | End: 2021-01-17

## 2020-03-29 RX ORDER — PSEUDOEPHEDRINE HCL 30 MG
100 TABLET ORAL 2 TIMES DAILY PRN
Qty: 60 CAP | Refills: 1 | Status: SHIPPED | OUTPATIENT
Start: 2020-03-29 | End: 2021-01-17

## 2020-03-29 RX ORDER — ACETAMINOPHEN 325 MG/1
325 TABLET ORAL EVERY 4 HOURS PRN
Qty: 30 TAB | Refills: 0 | Status: SHIPPED | OUTPATIENT
Start: 2020-03-29 | End: 2021-01-17

## 2020-03-29 RX ADMIN — IBUPROFEN 600 MG: 600 TABLET ORAL at 08:55

## 2020-03-29 RX ADMIN — IBUPROFEN 600 MG: 600 TABLET ORAL at 13:35

## 2020-03-29 RX ADMIN — IBUPROFEN 600 MG: 600 TABLET ORAL at 02:47

## 2020-03-29 ASSESSMENT — EDINBURGH POSTNATAL DEPRESSION SCALE (EPDS)
I HAVE BEEN ANXIOUS OR WORRIED FOR NO GOOD REASON: NO, NOT AT ALL
I HAVE BLAMED MYSELF UNNECESSARILY WHEN THINGS WENT WRONG: NO, NEVER
THINGS HAVE BEEN GETTING ON TOP OF ME: NO, I HAVE BEEN COPING AS WELL AS EVER
I HAVE BEEN ABLE TO LAUGH AND SEE THE FUNNY SIDE OF THINGS: AS MUCH AS I ALWAYS COULD
I HAVE FELT SCARED OR PANICKY FOR NO GOOD REASON: NO, NOT AT ALL
I HAVE BEEN SO UNHAPPY THAT I HAVE HAD DIFFICULTY SLEEPING: NOT AT ALL
THE THOUGHT OF HARMING MYSELF HAS OCCURRED TO ME: NEVER
I HAVE BEEN SO UNHAPPY THAT I HAVE BEEN CRYING: NO, NEVER
I HAVE FELT SAD OR MISERABLE: NO, NOT AT ALL
I HAVE LOOKED FORWARD WITH ENJOYMENT TO THINGS: AS MUCH AS I EVER DID

## 2020-03-29 NOTE — CONSULTS
Lactation note:  Initial visit. Father spoke and translated for mother. This is an experienced breastfeeding mother, who  her other children. She denies pain with latching. They report that infant last fed at 1700, and has been very sleepy. Reviewed normal  feeding behaviors and normal course of breastfeeding at 12-24-48-72 hours, and what to expect. Discussed importance of offering breast with feeding cues or at least every 2-3 hours, and even if infant shows no interest, can do hand expression into infant's lips. Showed MOB how to hand express colostrum. Good sized drops noted. Encouraged to continue doing skin to skin.    Plan for tonight is to continue to offer breast first, if not latching well, can hand express colostrum, and refeed to infant.    Encouraged her to continue to work on deep latch, and skin 2 skin, with hand expression.     MOB has WIC on [place].  Encouraged to call and make an appointment for lactation support.   Also, Information and phone number to [facility].     Encouraged parents to delay the bath. Discussed possible effects on wakefulness of infant for feedings after the procedure.    MOB has no other questions or concerns regarding breastfeeding. Encouraged to call for assistance as needed.

## 2020-03-29 NOTE — PROGRESS NOTES
Assumed care of patient. Assessment complete. Fundus is firm, with light lochia rubra. Pt complaining of some back pain and intermittent cramping, states pain 6/10, medicated per MAR. Discussed infant feedings, I&o sheet, feeding frequency. Pt verbalized understanding. States infant has been very sleepy at the breasts but has continued to attempt to feed. This RN assisted with latching infant, infant able to get onto breast for 3-5 minutes, but was very sleepy. Hand expression performed, colostrum present, infant still too sleepy. Discussed the need to wake infant to attempt to feed, and hand expressing into infants mouth if able to do so. MOB verbalized understanding. Infant placed skin to skin with MOB. Bed is locked and in low position. Call light left within reach and encouraged to call for any needs if necessary.

## 2020-03-29 NOTE — CARE PLAN
Problem: Communication  Goal: The ability to communicate needs accurately and effectively will improve  Outcome: MET     Problem: Safety  Goal: Will remain free from injury  Outcome: MET     Problem: Fluid Volume:  Goal: Will maintain balanced intake and output  Outcome: MET

## 2020-03-29 NOTE — PROGRESS NOTES
Patient discharged off of unit in wheelchair, infant in car seat in stable condition. Car seat safety check completed. Discharge education completed and AVS signed by patient. Emphasized importance of  screen to be completed following discharge (dates given). Cuddles tag & umbilical clamp removed. All questions answered at this time.

## 2020-03-29 NOTE — DISCHARGE SUMMARY
Discharge Summary:      Patricia Larson      Admit Date:   3/27/2020  Discharge Date:  3/29/2020     Admitting diagnosis:  Pregnancy  Indication for care in labor or delivery  Discharge Diagnosis: Status post vaginal, spontaneous.  Pregnancy Complications: none  Tubal Ligation:  no        History:  History reviewed. No pertinent past medical history.  OB History    Para Term  AB Living   5 5 5 0 0 5   SAB TAB Ectopic Molar Multiple Live Births   0 0 0   0 5      # Outcome Date GA Lbr Rick/2nd Weight Sex Delivery Anes PTL Lv   5 Term 20 37w6d 15:50 / 00:13 3.875 kg (8 lb 8.7 oz) M Vag-Spont  N JESUS   4 Term 11/10/16 40w3d  4.035 kg (8 lb 14.3 oz) F Vag-Spont None N JESUS      Birth Comments: IOL. Pt states no complications   3 Term 11 40w2d  3.799 kg (8 lb 6 oz) F Vag-Spont None N JESUS      Birth Comments: No complications   2 Term 05 40w0d  3.175 kg (7 lb) F Vag-Spont None N JESUS      Birth Comments: no complications.   1 Term 03 40w0d  3.572 kg (7 lb 14 oz) F Vag-Spont None N JESUS      Birth Comments: no complications.        Patient has no known allergies.  Patient Active Problem List    Diagnosis Date Noted   • History of thyroid nodule 2019   • History of macrosomia in infant in prior pregnancy - [id number] x 1 2019   • Advanced maternal age in multigravida 2019   • Encounter for supervision of normal pregnancy in third trimester 2019        Hospital Course:   34 y.o. , now para 5, was admitted with the above mentioned diagnosis, underwent Active Labor, vaginal, spontaneous. Patient postpartum course was unremarkable, with progressive advancement in diet , ambulation and toleration of oral analgesia. Patient without complaints today and desires discharge.  Patient is breastfeeding    Vitals:    20 1241 20 1600 20 2200 20 0600   BP: 104/50 105/67 105/63 132/77   Pulse: 93 87 71 94   Resp:  18 18 18   Temp:  36.4 °C (97.5 °F)  36.4 °C (97.6 °F) 36.5 °C (97.7 °F)   TempSrc:  Temporal Temporal Temporal   SpO2:  96% 97% 94%   Weight:       Height:           Current Facility-Administered Medications   Medication Dose   • ondansetron (ZOFRAN ODT) dispertab 4 mg  4 mg    Or   • ondansetron (ZOFRAN) syringe/vial injection 4 mg  4 mg   • metoclopramide (REGLAN) injection 10 mg  10 mg   • oxytocin (PITOCIN) infusion (for postpartum)  2,000 mL/hr    Followed by   • oxytocin (PITOCIN) infusion (for postpartum)   mL/hr   • acetaminophen (TYLENOL) tablet 325 mg  325 mg   • HYDROcodone-acetaminophen (NORCO) 5-325 MG per tablet 1 Tab  1 Tab   • HYDROcodone/acetaminophen (NORCO)  MG per tablet 1 Tab  1 Tab   • oxytocin (PITOCIN) 20 UNITS/1000ML LR (induction of labor)  0.5-20 lizzie-units/min   • ropivacaine (NAROPIN) injection     • LR infusion     • PRN oxytocin (PITOCIN) (20 Units/1000 mL) PRN for excessive uterine bleeding - See Admin Instr  125-999 mL/hr   • miSOPROStol (CYTOTEC) tablet 600 mcg  600 mcg   • docusate sodium (COLACE) capsule 100 mg  100 mg   • ibuprofen (MOTRIN) tablet 600 mg  600 mg   • acetaminophen (TYLENOL) tablet 650 mg  650 mg   • D5LR infusion         Exam:  Breast Exam: negative  Abdomen: Abdomen soft, non-tender. BS normal. No masses,  No organomegaly  Fundus Non Tender: yes  Incision: none  Perineum: perineum intact  Extremity: extremities, peripheral pulses and reflexes normal     Labs:  Recent Labs     03/27/20  2310 03/28/20 1956   WBC 11.5* 13.4*   RBC 4.26 3.72*   HEMOGLOBIN 13.0 11.1*   HEMATOCRIT 38.3 33.8*   MCV 89.9 90.9   MCH 30.5 29.8   MCHC 33.9 32.8*   RDW 46.5 47.0   PLATELETCT 221 213   MPV 11.1 11.2        Activity:   Discharge to home  Pelvic Rest x 6 weeks    Assessment:  normal postpartum course  Discharge Assessment: No heavy bleeding or foul vaginal discharge , Taking adequate diet and fluids, Voiding without difficulty    Contraception plans: mom is unsure at the moment, will discuss it  further at 5 week visit     Follow up: [facility] or [facility] in 5 weeks for vaginal; 1 week for incision check.   To resume daily PNV and iron supplement if needed with hydration.   Patient to RT [facility] or ER if any of the following occur:  Fever over 100.5  Severe abdominal pain  Red streaks or painful masses in the breasts  Foul smelling discharge or lochia  Heavy vaginal bleeding saturating a pad per hour  S/s of PP depression     Discharge Meds:   No current outpatient medications on file.       Tiffany Leavitt M.D.

## 2020-03-29 NOTE — CARE PLAN
Problem: Alteration in comfort related to episiotomy, vaginal repair and/or after birth pains  Goal: Patient is able to ambulate, care for self and infant  Outcome: PROGRESSING AS EXPECTED  Note: Pt states she is ambulating without difficulty. No dizziness or lightheadedness reported. Involved in infant cares.      Problem: Potential knowledge deficit related to lack of understanding of self and  care  Goal: Patient will verbalize understanding of self and infant care  Outcome: PROGRESSING AS EXPECTED  Note: MOB and infant cares discussed, mother verbalizes understanding.

## 2020-04-02 ENCOUNTER — ROUTINE PRENATAL (OUTPATIENT)
Dept: OBGYN | Facility: CLINIC | Age: 35
End: 2020-04-02

## 2020-04-02 DIAGNOSIS — O09.522 MULTIGRAVIDA OF ADVANCED MATERNAL AGE IN SECOND TRIMESTER: ICD-10-CM

## 2020-05-08 ENCOUNTER — POST PARTUM (OUTPATIENT)
Dept: OBGYN | Facility: CLINIC | Age: 35
End: 2020-05-08

## 2020-05-08 VITALS — WEIGHT: 170 LBS | SYSTOLIC BLOOD PRESSURE: 110 MMHG | DIASTOLIC BLOOD PRESSURE: 64 MMHG | BODY MASS INDEX: 29.18 KG/M2

## 2020-05-08 RX ORDER — NORGESTIMATE AND ETHINYL ESTRADIOL 0.25-0.035
1 KIT ORAL DAILY
Qty: 84 TAB | Refills: 4 | Status: SHIPPED | OUTPATIENT
Start: 2020-05-08 | End: 2021-05-27

## 2020-05-08 ASSESSMENT — EDINBURGH POSTNATAL DEPRESSION SCALE (EPDS)
I HAVE BEEN SO UNHAPPY THAT I HAVE BEEN CRYING: NO, NEVER
I HAVE FELT SCARED OR PANICKY FOR NO GOOD REASON: NO, NOT AT ALL
I HAVE BEEN SO UNHAPPY THAT I HAVE HAD DIFFICULTY SLEEPING: NOT AT ALL
TOTAL SCORE: 1
THINGS HAVE BEEN GETTING ON TOP OF ME: NO, MOST OF THE TIME I HAVE COPED QUITE WELL
I HAVE FELT SAD OR MISERABLE: NO, NOT AT ALL
I HAVE BLAMED MYSELF UNNECESSARILY WHEN THINGS WENT WRONG: NO, NEVER
THE THOUGHT OF HARMING MYSELF HAS OCCURRED TO ME: NEVER
I HAVE BEEN ABLE TO LAUGH AND SEE THE FUNNY SIDE OF THINGS: AS MUCH AS I ALWAYS COULD
I HAVE BEEN ANXIOUS OR WORRIED FOR NO GOOD REASON: NO, NOT AT ALL
I HAVE LOOKED FORWARD WITH ENJOYMENT TO THINGS: AS MUCH AS I EVER DID

## 2020-05-08 ASSESSMENT — ENCOUNTER SYMPTOMS
NEUROLOGICAL NEGATIVE: 1
EYES NEGATIVE: 1
PSYCHIATRIC NEGATIVE: 1
CARDIOVASCULAR NEGATIVE: 1
GASTROINTESTINAL NEGATIVE: 1
RESPIRATORY NEGATIVE: 1
BACK PAIN: 1
CONSTITUTIONAL NEGATIVE: 1
MYALGIAS: 1

## 2020-05-08 NOTE — PROGRESS NOTES
Pt here today for postpartum exam.  Delivery Date:  03/28/2020  Currently breast and formula feeding  BCM: Implant, [facility].   LMP: not yet  WT: 170 lb  BP: 110/64  Pt states no complaints or concerns today  Good ph: [phone]

## 2020-05-08 NOTE — PROGRESS NOTES
Subjective:    Patricia Larson is a 34 y.o. female who presents for her postpartum exam 6 weeks following  on 3/28/20. Her prenatal course was uncomplicated. She denies dysuria, vaginal bleeding, odor, itching or breast problems. She is breastfeeding. She desires an nexplanon for her birth control method. Reports denies sex prior to this appointment. Eating a regular diet without difficulty. Bowel movement are Normal.  She is having  A little back pain at the spot where her epidural was.  No longer bleeding. Patient Denies Incisional pain, drainage or redness. Patient denies postpartum depression.   Review of Systems   Constitutional: Negative.    HENT: Negative.    Eyes: Negative.    Respiratory: Negative.    Cardiovascular: Negative.    Gastrointestinal: Negative.    Genitourinary: Negative.    Musculoskeletal: Positive for back pain and myalgias.        Groin pain   Skin: Negative.    Neurological: Negative.    Endo/Heme/Allergies: Negative.    Psychiatric/Behavioral: Negative.    All other systems reviewed and are negative.      Problem List     Patient Active Problem List    Diagnosis Date Noted   • History of thyroid nodule 2019   • History of macrosomia in infant in prior pregnancy - [id number] x 1 2019   • Advanced maternal age in multigravida 2019       Objective  Physical Exam    See PE  Lab: H&H at d/c:   /64   Wt 77.1 kg (170 lb)   BMI 29.18 kg/m²     Assessment:    1. PP care of lactating women   2. Exam WNL   3. Pap WNL on 2019  4. Desires contraception       Plan:    1. Breastfeeding support   2. Continue PNV   3. Contraceptive counseling - follow up w health dept,  [facility], or [facility] for ***  4. Encouraged condom use ***  5. Discussed diet, exercise and resumption of sexual activity   6. Preconception guidance for next pregnancy if applicable. *** risk factors. Folic acid for all women of childbearing age.

## 2021-01-17 ENCOUNTER — HOSPITAL ENCOUNTER (EMERGENCY)
Facility: MEDICAL CENTER | Age: 36
End: 2021-01-17

## 2021-01-17 VITALS
TEMPERATURE: 97.9 F | SYSTOLIC BLOOD PRESSURE: 123 MMHG | RESPIRATION RATE: 16 BRPM | BODY MASS INDEX: 28.53 KG/M2 | WEIGHT: 166.23 LBS | DIASTOLIC BLOOD PRESSURE: 80 MMHG | OXYGEN SATURATION: 97 % | HEART RATE: 85 BPM

## 2021-01-17 PROCEDURE — 302449 STATCHG TRIAGE ONLY (STATISTIC)
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
## 2021-05-10 ENCOUNTER — APPOINTMENT (OUTPATIENT)
Dept: RADIOLOGY | Facility: MEDICAL CENTER | Age: 36
End: 2021-05-10
Attending: EMERGENCY MEDICINE

## 2021-05-10 ENCOUNTER — HOSPITAL ENCOUNTER (EMERGENCY)
Facility: MEDICAL CENTER | Age: 36
End: 2021-05-10
Attending: EMERGENCY MEDICINE
Payer: COMMERCIAL

## 2021-05-10 VITALS
DIASTOLIC BLOOD PRESSURE: 56 MMHG | OXYGEN SATURATION: 98 % | BODY MASS INDEX: 29.23 KG/M2 | RESPIRATION RATE: 16 BRPM | TEMPERATURE: 97.1 F | HEIGHT: 66 IN | WEIGHT: 181.88 LBS | SYSTOLIC BLOOD PRESSURE: 120 MMHG | HEART RATE: 95 BPM

## 2021-05-10 DIAGNOSIS — K76.0 FATTY LIVER: ICD-10-CM

## 2021-05-10 DIAGNOSIS — K80.20 CALCULUS OF GALLBLADDER WITHOUT CHOLECYSTITIS WITHOUT OBSTRUCTION: ICD-10-CM

## 2021-05-10 DIAGNOSIS — R10.13 EPIGASTRIC ABDOMINAL PAIN: ICD-10-CM

## 2021-05-10 DIAGNOSIS — R79.89 ABNORMAL LFTS: ICD-10-CM

## 2021-05-10 DIAGNOSIS — N30.00 ACUTE CYSTITIS WITHOUT HEMATURIA: ICD-10-CM

## 2021-05-10 LAB
ALBUMIN SERPL BCP-MCNC: 3.5 G/DL (ref 3.2–4.9)
ALBUMIN/GLOB SERPL: 1.2 G/DL
ALP SERPL-CCNC: 81 U/L (ref 30–99)
ALT SERPL-CCNC: 57 U/L (ref 2–50)
ANION GAP SERPL CALC-SCNC: 12 MMOL/L (ref 7–16)
APPEARANCE UR: ABNORMAL
AST SERPL-CCNC: 174 U/L (ref 12–45)
BACTERIA #/AREA URNS HPF: ABNORMAL /HPF
BASOPHILS # BLD AUTO: 0.3 % (ref 0–1.8)
BASOPHILS # BLD: 0.03 K/UL (ref 0–0.12)
BILIRUB SERPL-MCNC: 0.4 MG/DL (ref 0.1–1.5)
BILIRUB UR QL STRIP.AUTO: NEGATIVE
BUN SERPL-MCNC: 6 MG/DL (ref 8–22)
CALCIUM SERPL-MCNC: 9.1 MG/DL (ref 8.4–10.2)
CHLORIDE SERPL-SCNC: 103 MMOL/L (ref 96–112)
CO2 SERPL-SCNC: 22 MMOL/L (ref 20–33)
COLOR UR: YELLOW
CREAT SERPL-MCNC: 0.43 MG/DL (ref 0.5–1.4)
EOSINOPHIL # BLD AUTO: 0.03 K/UL (ref 0–0.51)
EOSINOPHIL NFR BLD: 0.3 % (ref 0–6.9)
EPI CELLS #/AREA URNS HPF: ABNORMAL /HPF
ERYTHROCYTE [DISTWIDTH] IN BLOOD BY AUTOMATED COUNT: 45.8 FL (ref 35.9–50)
GLOBULIN SER CALC-MCNC: 2.9 G/DL (ref 1.9–3.5)
GLUCOSE SERPL-MCNC: 92 MG/DL (ref 65–99)
GLUCOSE UR STRIP.AUTO-MCNC: NEGATIVE MG/DL
HCT VFR BLD AUTO: 34.1 % (ref 37–47)
HGB BLD-MCNC: 11.2 G/DL (ref 12–16)
IMM GRANULOCYTES # BLD AUTO: 0.11 K/UL (ref 0–0.11)
IMM GRANULOCYTES NFR BLD AUTO: 1 % (ref 0–0.9)
KETONES UR STRIP.AUTO-MCNC: NEGATIVE MG/DL
LEUKOCYTE ESTERASE UR QL STRIP.AUTO: NEGATIVE
LIPASE SERPL-CCNC: 31 U/L (ref 7–58)
LYMPHOCYTES # BLD AUTO: 2.04 K/UL (ref 1–4.8)
LYMPHOCYTES NFR BLD: 18.2 % (ref 22–41)
MCH RBC QN AUTO: 30.8 PG (ref 27–33)
MCHC RBC AUTO-ENTMCNC: 32.8 G/DL (ref 33.6–35)
MCV RBC AUTO: 93.7 FL (ref 81.4–97.8)
MICRO URNS: ABNORMAL
MONOCYTES # BLD AUTO: 0.83 K/UL (ref 0–0.85)
MONOCYTES NFR BLD AUTO: 7.4 % (ref 0–13.4)
MUCOUS THREADS #/AREA URNS HPF: ABNORMAL /HPF
NEUTROPHILS # BLD AUTO: 8.19 K/UL (ref 2–7.15)
NEUTROPHILS NFR BLD: 72.8 % (ref 44–72)
NITRITE UR QL STRIP.AUTO: POSITIVE
NRBC # BLD AUTO: 0 K/UL
NRBC BLD-RTO: 0 /100 WBC
PH UR STRIP.AUTO: 7 [PH] (ref 5–8)
PLATELET # BLD AUTO: 205 K/UL (ref 164–446)
PMV BLD AUTO: 11 FL (ref 9–12.9)
POTASSIUM SERPL-SCNC: 3.7 MMOL/L (ref 3.6–5.5)
PROT SERPL-MCNC: 6.4 G/DL (ref 6–8.2)
PROT UR QL STRIP: NEGATIVE MG/DL
RBC # BLD AUTO: 3.64 M/UL (ref 4.2–5.4)
RBC # URNS HPF: ABNORMAL /HPF
RBC UR QL AUTO: NEGATIVE
SODIUM SERPL-SCNC: 137 MMOL/L (ref 135–145)
SP GR UR STRIP.AUTO: 1.01
UNIDENT CRYS URNS QL MICRO: ABNORMAL /HPF
WBC # BLD AUTO: 11.2 K/UL (ref 4.8–10.8)
WBC #/AREA URNS HPF: ABNORMAL /HPF

## 2021-05-10 PROCEDURE — 83690 ASSAY OF LIPASE: CPT

## 2021-05-10 PROCEDURE — 80053 COMPREHEN METABOLIC PANEL: CPT

## 2021-05-10 PROCEDURE — 700102 HCHG RX REV CODE 250 W/ 637 OVERRIDE(OP): Performed by: EMERGENCY MEDICINE

## 2021-05-10 PROCEDURE — A9270 NON-COVERED ITEM OR SERVICE: HCPCS | Performed by: EMERGENCY MEDICINE

## 2021-05-10 PROCEDURE — 96374 THER/PROPH/DIAG INJ IV PUSH: CPT

## 2021-05-10 PROCEDURE — 81001 URINALYSIS AUTO W/SCOPE: CPT

## 2021-05-10 PROCEDURE — 76705 ECHO EXAM OF ABDOMEN: CPT

## 2021-05-10 PROCEDURE — 700111 HCHG RX REV CODE 636 W/ 250 OVERRIDE (IP): Performed by: EMERGENCY MEDICINE

## 2021-05-10 PROCEDURE — 99285 EMERGENCY DEPT VISIT HI MDM: CPT

## 2021-05-10 PROCEDURE — 85025 COMPLETE CBC W/AUTO DIFF WBC: CPT

## 2021-05-10 PROCEDURE — 76815 OB US LIMITED FETUS(S): CPT

## 2021-05-10 RX ORDER — FAMOTIDINE 20 MG/1
20 TABLET, FILM COATED ORAL 2 TIMES DAILY
Qty: 15 TABLET | Refills: 0 | Status: ON HOLD | OUTPATIENT
Start: 2021-05-10 | End: 2021-09-20

## 2021-05-10 RX ORDER — CEPHALEXIN 500 MG/1
500 CAPSULE ORAL 4 TIMES DAILY
Qty: 20 CAPSULE | Refills: 0 | Status: SHIPPED | OUTPATIENT
Start: 2021-05-10 | End: 2021-05-15

## 2021-05-10 RX ADMIN — LIDOCAINE HYDROCHLORIDE 15 ML: 20 SOLUTION OROPHARYNGEAL at 02:39

## 2021-05-10 RX ADMIN — FAMOTIDINE 20 MG: 10 INJECTION INTRAVENOUS at 02:39

## 2021-05-10 ASSESSMENT — PAIN DESCRIPTION - DESCRIPTORS: DESCRIPTORS: SHARP

## 2021-05-10 NOTE — ED TRIAGE NOTES
Pt is 5 months pregnant and c/o epigastric pain with n/v. States ate a hamburger today and started vomiting. States vomited four times today. No current complications with pregnancy. No complications with previous pregnancies. This is 6th pregnancy. Med hx: denies.

## 2021-05-10 NOTE — ED PROVIDER NOTES
"ED Provider Note    ER Provider Note         CHIEF COMPLAINT  Chief Complaint   Patient presents with   • Abdominal Pain       HPI  Patricia Larson is a 35 y.o. female who presents to the Emergency Department with epigastric any vomiting but has felt nauseous.  She says there is a burning pain in the epigastric region.  She says it started today.  She denies any dysuria.  There is no lower abdominal pain there is no vaginal discharge, bleeding or pain.  She denies any chest pain at this time.  She describes the pain as burning.  She denies any drugs or alcohol.    REVIEW OF SYSTEMS  See HPI for further details. All other systems are negative.     PAST MEDICAL HISTORY       SURGICAL HISTORY  patient denies any surgical history    SOCIAL HISTORY  Social History     Tobacco Use   • Smoking status: Never Smoker   • Smokeless tobacco: Never Used   Substance Use Topics   • Alcohol use: No     Comment: socially    • Drug use: No      Social History     Substance and Sexual Activity   Drug Use No       FAMILY HISTORY  Family History   Problem Relation Age of Onset   • Cancer Maternal Grandfather        CURRENT MEDICATIONS  Home Medications    **Home medications have not yet been reviewed for this encounter**         ALLERGIES  No Known Allergies    PHYSICAL EXAM  VITAL SIGNS: /56   Pulse 95   Temp 36.2 °C (97.1 °F) (Tympanic)   Resp 16   Ht 1.676 m (5' 6\")   Wt 82.5 kg (181 lb 14.1 oz)   LMP  (Approximate)   SpO2 98%   Breastfeeding No   BMI 29.36 kg/m²      Constitutional: Alert in no apparent distress.  HENT: No signs of trauma, Bilateral external ears normal, Nose normal.   Eyes: Pupils are equal and reactive, Conjunctiva normal, Non-icteric.   Neck: Normal range of motion, No tenderness, Supple, No stridor.   Lymphatic: No lymphadenopathy noted.   Cardiovascular: Regular rate and rhythm, nondisplaced PMI  Thorax & Lungs: No respiratory distress,  No chest tenderness.   Abdomen: Bowel sounds normal, " Soft, tenderness palpation over the epigastrium.  The patient has a gravid uterus., No masses, No pulsatile masses. No peritoneal signs.  Skin: Warm, Dry, No erythema, No rash.   Back: No bony tenderness, No CVA tenderness.   Extremities: Intact distal pulses, No edema, No tenderness, No cyanosis.  Musculoskeletal: Good range of motion in all major joints. No tenderness to palpation or major deformities noted.   Neurologic: Alert , Normal motor function, Normal sensory function, No focal deficits noted.   Psychiatric: Affect normal, Judgment normal, Mood normal.     DIAGNOSTIC STUDIES / PROCEDURES        LABS  Labs Reviewed   CBC WITH DIFFERENTIAL - Abnormal; Notable for the following components:       Result Value    WBC 11.2 (*)     RBC 3.64 (*)     Hemoglobin 11.2 (*)     Hematocrit 34.1 (*)     MCHC 32.8 (*)     Neutrophils-Polys 72.80 (*)     Lymphocytes 18.20 (*)     Immature Granulocytes 1.00 (*)     Neutrophils (Absolute) 8.19 (*)     All other components within normal limits   COMP METABOLIC PANEL - Abnormal; Notable for the following components:    Bun 6 (*)     Creatinine 0.43 (*)     AST(SGOT) 174 (*)     ALT(SGPT) 57 (*)     All other components within normal limits   URINALYSIS,CULTURE IF INDICATED - Abnormal; Notable for the following components:    Character Hazy (*)     Nitrite Positive (*)     All other components within normal limits    Narrative:     Indication for culture:->Patient WITHOUT an indwelling Lieberman  catheter in place with new onset of Dysuria, Frequency,  Urgency, and/or Suprapubic pain   URINE MICROSCOPIC (W/UA) - Abnormal; Notable for the following components:    Bacteria Few (*)     All other components within normal limits    Narrative:     Indication for culture:->Patient WITHOUT an indwelling Lieberman  catheter in place with new onset of Dysuria, Frequency,  Urgency, and/or Suprapubic pain   LIPASE   ESTIMATED GFR           All labs reviewed by me.    RADIOLOGY  US-RUQ   Final  Result         1.  Hepatomegaly and echogenic liver compatible with fatty change versus fibrosis.   2.  Cholelithiasis without additional sonographic findings of acute cholecystitis.      US-OB LIMITED TRANSABDOMINAL   Final Result         1.  Single intrauterine pregnancy of an estimated gestational age of 20 weeks, 2 days weeks/days with an estimated date of delivery of 09/25/2021. Fetal biometry size less than 2nd percentile, recommend four-week follow-up ultrasound for evaluation of    interval growth.   2.  Cholelithiasis               The radiologist's interpretation of all radiological studies have been reviewed by me.    COURSE & MEDICAL DECISION MAKING  Pertinent Labs & Imaging studies reviewed. (See chart for details)    This is a 35 y.o. female that presents with epigastric pain and pregnancy.  At this time is could present pancreatitis versus hepatitis versus gastritis versus pancreatitis.  We will also get a urinalysis to evaluate for infection here.  We will reassess the patient after this.  We will get a CMP as well as lipase and CBC.  We will get an ultrasound to assure the fetus is well.  We will treat the patient's pain with Pepcid as well as GI cocktail..     1:37 AM - Patient seen and examined at bedside.       Patient was found to have an intrauterine pregnancy that is normal.  The patient had some LFT abnormalities.  An ultrasound was done and was negative.  The patient was also found to have nitrite positive urine.  We will be giving her antibiotics for this.  Patient's lipase is normal.  The patient does have biliary stones therefore we will have her follow-up with her primary care physician.  There is no evidence of cholecystitis.    The patient left before we are able to discuss a urinary tract infection and we will call her back to let her know the antibiotics been sent to her pharmacy.        FINAL IMPRESSION  1. Fatty liver    2. Calculus of gallbladder without cholecystitis without  obstruction    3. Epigastric abdominal pain    4. Abnormal LFTs    5. Acute cystitis without hematuria              Electronically signed by: Dio Garcia M.D., 5/10/2021

## 2021-05-21 ENCOUNTER — APPOINTMENT (OUTPATIENT)
Dept: OBGYN | Facility: CLINIC | Age: 36
End: 2021-05-21
Payer: COMMERCIAL

## 2021-05-27 ENCOUNTER — HOSPITAL ENCOUNTER (OUTPATIENT)
Facility: MEDICAL CENTER | Age: 36
End: 2021-05-27
Attending: NURSE PRACTITIONER
Payer: COMMERCIAL

## 2021-05-27 ENCOUNTER — INITIAL PRENATAL (OUTPATIENT)
Dept: OBGYN | Facility: CLINIC | Age: 36
End: 2021-05-27

## 2021-05-27 VITALS — SYSTOLIC BLOOD PRESSURE: 122 MMHG | DIASTOLIC BLOOD PRESSURE: 60 MMHG | WEIGHT: 179.2 LBS | BODY MASS INDEX: 28.92 KG/M2

## 2021-05-27 DIAGNOSIS — O09.892 SUPERVISION OF OTHER HIGH RISK PREGNANCIES, SECOND TRIMESTER: ICD-10-CM

## 2021-05-27 DIAGNOSIS — K80.80 BILIARY CALCULUS OF OTHER SITE WITHOUT OBSTRUCTION: ICD-10-CM

## 2021-05-27 DIAGNOSIS — Z34.90 PRENATAL CARE, ANTEPARTUM: ICD-10-CM

## 2021-05-27 DIAGNOSIS — Z86.39 HISTORY OF THYROID NODULE: ICD-10-CM

## 2021-05-27 DIAGNOSIS — O09.892 SUPERVISION OF OTHER HIGH RISK PREGNANCIES, SECOND TRIMESTER: Primary | ICD-10-CM

## 2021-05-27 DIAGNOSIS — O09.299 HISTORY OF MACROSOMIA IN INFANT IN PRIOR PREGNANCY, CURRENTLY PREGNANT: ICD-10-CM

## 2021-05-27 PROBLEM — K80.20 CHOLELITHIASIS: Status: ACTIVE | Noted: 2021-05-27

## 2021-05-27 LAB
APPEARANCE UR: NORMAL
BILIRUB UR STRIP-MCNC: NORMAL MG/DL
C TRACH DNA SPEC QL NAA+PROBE: NEGATIVE
COLOR UR AUTO: NORMAL
GLUCOSE UR STRIP.AUTO-MCNC: NEGATIVE MG/DL
KETONES UR STRIP.AUTO-MCNC: NEGATIVE MG/DL
LEUKOCYTE ESTERASE UR QL STRIP.AUTO: NORMAL
N GONORRHOEA DNA SPEC QL NAA+PROBE: NEGATIVE
NITRITE UR QL STRIP.AUTO: NORMAL
PH UR STRIP.AUTO: 6.5 [PH] (ref 5–8)
PROT UR QL STRIP: NEGATIVE MG/DL
RBC UR QL AUTO: NEGATIVE
SP GR UR STRIP.AUTO: 1.02
SPECIMEN SOURCE: NORMAL
UROBILINOGEN UR STRIP-MCNC: NORMAL MG/DL

## 2021-05-27 PROCEDURE — 81002 URINALYSIS NONAUTO W/O SCOPE: CPT | Performed by: NURSE PRACTITIONER

## 2021-05-27 PROCEDURE — 90040 PR PRENATAL FOLLOW UP: CPT | Performed by: NURSE PRACTITIONER

## 2021-05-27 ASSESSMENT — EDINBURGH POSTNATAL DEPRESSION SCALE (EPDS)
I HAVE FELT SAD OR MISERABLE: NO, NOT AT ALL
THE THOUGHT OF HARMING MYSELF HAS OCCURRED TO ME: NEVER
I HAVE BEEN ANXIOUS OR WORRIED FOR NO GOOD REASON: NO, NOT AT ALL
I HAVE LOOKED FORWARD WITH ENJOYMENT TO THINGS: AS MUCH AS I EVER DID
I HAVE BEEN ABLE TO LAUGH AND SEE THE FUNNY SIDE OF THINGS: AS MUCH AS I ALWAYS COULD
TOTAL SCORE: 0
I HAVE BLAMED MYSELF UNNECESSARILY WHEN THINGS WENT WRONG: NO, NEVER
THINGS HAVE BEEN GETTING ON TOP OF ME: NO, I HAVE BEEN COPING AS WELL AS EVER
I HAVE BEEN SO UNHAPPY THAT I HAVE BEEN CRYING: NO, NEVER
I HAVE FELT SCARED OR PANICKY FOR NO GOOD REASON: NO, NOT AT ALL
I HAVE BEEN SO UNHAPPY THAT I HAVE HAD DIFFICULTY SLEEPING: NOT AT ALL

## 2021-05-27 ASSESSMENT — FIBROSIS 4 INDEX: FIB4 SCORE: 4.05

## 2021-05-27 NOTE — PROGRESS NOTES
CC: New Ob visit     Subjective Ms. Patricia Larson  22w5d by 20w2d US presents for prenatal care. Today is her first prenatal visit at [facility] She denies any contractions/cramping, leakage of fluid, vaginal bleeding. She does feel movement yet. Pregnancy is unplanned but wanted.  Pt is single.  Lives with FOB and children.  Not presently working.      I have reviewed the patients' medical, surgical, gynecological, obstetrical, social, and family histories, medications and available lab results and pertinent notes are as follows:     OB History    Para Term  AB Living   6 5 5 0 0 5   SAB TAB Ectopic Molar Multiple Live Births   0 0 0 0 0 5       Past Gynecological History:  Denies fibroids, ovarian cysts, abnormal pap smears, STDs. She denies ever having surgery on her cervix, uterus, or ovaries in the past.  Last pap smear was 2019 - wnl.    History reviewed. No pertinent past medical history.  History reviewed. No pertinent surgical history.   Social History     Socioeconomic History   • Marital status: Single     Spouse name: Not on file   • Number of children: Not on file   • Years of education: Not on file   • Highest education level: Not on file   Occupational History   • Not on file   Tobacco Use   • Smoking status: Never Smoker   • Smokeless tobacco: Never Used   Vaping Use   • Vaping Use: Never used   Substance and Sexual Activity   • Alcohol use: No     Comment: socially    • Drug use: No   • Sexual activity: Not Currently     Partners: Male     Birth control/protection: I.U.D., Pill     Comment: Unplanned pregnancy, Pt states had IUD removed 2019   Other Topics Concern   • Not on file   Social History Narrative    ** Merged History Encounter **          Social Determinants of Health     Financial Resource Strain:    • Difficulty of Paying Living Expenses:    Food Insecurity:    • Worried About Running Out of Food in the Last Year:    • Ran Out of Food in  the Last Year:    Transportation Needs:    • Lack of Transportation (Medical):    • Lack of Transportation (Non-Medical):    Physical Activity:    • Days of Exercise per Week:    • Minutes of Exercise per Session:    Stress:    • Feeling of Stress :    Social Connections:    • Frequency of Communication with Friends and Family:    • Frequency of Social Gatherings with Friends and Family:    • Attends Faith Services:    • Active Member of Clubs or Organizations:    • Attends Club or Organization Meetings:    • Marital Status:    Intimate Partner Violence:    • Fear of Current or Ex-Partner:    • Emotionally Abused:    • Physically Abused:    • Sexually Abused:      Family History:   Family History   Problem Relation Age of Onset   • No Known Problems Mother    • No Known Problems Father    • No Known Problems Sister    • Cancer Maternal Grandfather        Genetic Screening/Teratology Counseling- Includes patient, baby's father, or anyone in either family with:  Patient's age 35 years or older as of estimated date of delivery: Yes   Thalassemia (Italian, Greek, Mediterranean, or  background): MCV less than 80: No   Neural tube defect (Meningomyelocele, Spina bifida, or Anencephaly): No   Congenital heart defect: No   Down syndrome: No   Christ-Sachs (Ashkenazi Latter day, Cajun, Romanian [place]): No   Canavan disease (Ashkenazi Latter day): No   Familial dysautonomia (Ashkenazi Latter day): No   Sickle cell disease or trait (): No   Hemophilia or other blood disorders: No   Muscular dystrophy: No    Cystic fibrosis: No   [place]'s chorea: No   Mental retardation/autism: No   Other inherited genetic or chromosomal disorder: No   Maternal metabolic disorder (eg. Type 1 diabetes, PKU): No   Patient or baby's father had child with birth defects not listed above: No   Recurrent pregnancy loss, or a stillbirth: No   Medications (including supplements, vitamins, herbs, or OTC drugs)/illicit/recreational drugs/alcohol  since last menstrual period: No           Infection Prevention  1. High Risk For HIV: No 6. Rash Or Illness Since LMP: No   2. High Risk For Hepatitis B or C: No 7. History Of STD, GC, Chlamydia, HPV Syphilis: No   3. Live With Someone With TB Or Exposed To TB: No 8. Have a cat in the home?: No   4. Patient Or Partner Has A History Of Herpes: No    5. History of Chicken Pox: No 9. Other (See Comments Below): No          Allergies: Patient has no known allergies.  Objective:/60   Wt 81.3 kg (179 lb 3.2 oz)      Objective:   Vitals:    21 0846   BP: 120/60     Prenatal Physical:  General Exam:  HEENT: normal    Heart: normal    Thyroid: normal    Lungs: normal    Lymph Nodes: normal    Breasts: normal    Neurological: normal    Abdomen: normal    Skin: normal    Extremities: normal    Pelvic Exam:  Vulva:  Normal  Vagina:  Normal  Vagina comment:   x 5 proven to 8#14oz  Cervix:  Normal  Uterus (wks):  22  Adnexa:  Normal  Rectum:  Not assessed       Lab:   Recent Results (from the past 672 hour(s))   CBC WITH DIFFERENTIAL    Collection Time: 05/10/21  2:32 AM   Result Value Ref Range    WBC 11.2 (H) 4.8 - 10.8 K/uL    RBC 3.64 (L) 4.20 - 5.40 M/uL    Hemoglobin 11.2 (L) 12.0 - 16.0 g/dL    Hematocrit 34.1 (L) 37.0 - 47.0 %    MCV 93.7 81.4 - 97.8 fL    MCH 30.8 27.0 - 33.0 pg    MCHC 32.8 (L) 33.6 - 35.0 g/dL    RDW 45.8 35.9 - 50.0 fL    Platelet Count 205 164 - 446 K/uL    MPV 11.0 9.0 - 12.9 fL    Neutrophils-Polys 72.80 (H) 44.00 - 72.00 %    Lymphocytes 18.20 (L) 22.00 - 41.00 %    Monocytes 7.40 0.00 - 13.40 %    Eosinophils 0.30 0.00 - 6.90 %    Basophils 0.30 0.00 - 1.80 %    Immature Granulocytes 1.00 (H) 0.00 - 0.90 %    Nucleated RBC 0.00 /100 WBC    Neutrophils (Absolute) 8.19 (H) 2.00 - 7.15 K/uL    Lymphs (Absolute) 2.04 1.00 - 4.80 K/uL    Monos (Absolute) 0.83 0.00 - 0.85 K/uL    Eos (Absolute) 0.03 0.00 - 0.51 K/uL    Baso (Absolute) 0.03 0.00 - 0.12 K/uL    Immature Granulocytes  (abs) 0.11 0.00 - 0.11 K/uL    NRBC (Absolute) 0.00 K/uL   COMP METABOLIC PANEL    Collection Time: 05/10/21  2:32 AM   Result Value Ref Range    Sodium 137 135 - 145 mmol/L    Potassium 3.7 3.6 - 5.5 mmol/L    Chloride 103 96 - 112 mmol/L    Co2 22 20 - 33 mmol/L    Anion Gap 12.0 7.0 - 16.0    Glucose 92 65 - 99 mg/dL    Bun 6 (L) 8 - 22 mg/dL    Creatinine 0.43 (L) 0.50 - 1.40 mg/dL    Calcium 9.1 8.4 - 10.2 mg/dL    AST(SGOT) 174 (H) 12 - 45 U/L    ALT(SGPT) 57 (H) 2 - 50 U/L    Alkaline Phosphatase 81 30 - 99 U/L    Total Bilirubin 0.4 0.1 - 1.5 mg/dL    Albumin 3.5 3.2 - 4.9 g/dL    Total Protein 6.4 6.0 - 8.2 g/dL    Globulin 2.9 1.9 - 3.5 g/dL    A-G Ratio 1.2 g/dL   LIPASE    Collection Time: 05/10/21  2:32 AM   Result Value Ref Range    Lipase 31 7 - 58 U/L   ESTIMATED GFR    Collection Time: 05/10/21  2:32 AM   Result Value Ref Range    GFR If African American >60 >60 mL/min/1.73 m 2    GFR If Non African American >60 >60 mL/min/1.73 m 2   URINALYSIS CULTURE, IF INDICATED    Collection Time: 05/10/21  3:52 AM    Specimen: Urine   Result Value Ref Range    Color Yellow     Character Hazy (A)     Specific Gravity 1.010 <1.035    Ph 7.0 5.0 - 8.0    Glucose Negative Negative mg/dL    Ketones Negative Negative mg/dL    Protein Negative Negative mg/dL    Bilirubin Negative Negative    Nitrite Positive (A) Negative    Leukocyte Esterase Negative Negative    Occult Blood Negative Negative    Micro Urine Req Microscopic    URINE MICROSCOPIC (W/UA)    Collection Time: 05/10/21  3:52 AM   Result Value Ref Range    WBC 0-2 /hpf    RBC 0-2 /hpf    Bacteria Few (A) None /hpf    Epithelial Cells Few Few /hpf    Mucous Threads Few /hpf    Urine Crystals Few Amorphous /hpf   POCT Urinalysis    Collection Time: 05/27/21  9:17 AM   Result Value Ref Range    POC Color      POC Appearance      POC Leukocyte Esterase Trace Negative    POC Nitrites Negatvie Negative    POC Urobiligen      POC Protein Negative Negative mg/dL     POC Urine PH 6.5 5.0 - 8.0    POC Blood Negative Negative    POC Specific Gravity 1.025 <1.005 - >1.030    POC Ketones Negative Negative mg/dL    POC Bilirubin      POC Glucose Negative Negative mg/dL       Ultrasound:  Reviewed initial scan and EL is by 20w2d US    Assessment:  --Normal Exam at 22 weeks, Size consistent with dates and FHR positive  Patient Active Problem List   Diagnosis   • History of thyroid nodule   • History of macrosomia in infant in prior pregnancy - [id number] x 1   • Advanced maternal age in multigravida   • Supervision of other high risk pregnancies, second trimester   • Cholelithiasis         Plan:  Reviewed the patients risk factors for this pregnancy.  Genetic screening was discussed with literature on Prenatal Screening, Cystic Fibrosis, and SMA provided and the patient desires AFP.  Discuss importance of water intake, healthy diet, eating 5 small meals throughout the day to maintain blood sugar and taking PNV  If has nausea, take Vitamin B6 25mg TID with doxylamine/Unisom 25mg at night  Discuss importance of exercise, as well as rest  Prenatal labs ordered  GCCT done.  Complete OB US next available.  Discuss policy for OB care at [facility]  Follow up in 4 weeks for next visit     Chaperone offered and provided by Marci Au MA.

## 2021-05-27 NOTE — NON-PROVIDER
Pt. Here for NOB visit.  LMP unknown pt states she was breast feeding.   # [phone]  Pt states was seen at [facility] in [facility] on 5/10/2021 due to upper abdominal pain, pt states was told has gallstones.   Pt. States feeling + FM.   Pharmacy verified.   Chaperone offered and not needed.   Last pap smear 9/24/2019 WNL

## 2021-05-28 ENCOUNTER — APPOINTMENT (OUTPATIENT)
Dept: RADIOLOGY | Facility: IMAGING CENTER | Age: 36
End: 2021-05-28
Attending: NURSE PRACTITIONER

## 2021-05-28 ENCOUNTER — HOSPITAL ENCOUNTER (OUTPATIENT)
Dept: LAB | Facility: MEDICAL CENTER | Age: 36
End: 2021-05-28
Attending: NURSE PRACTITIONER
Payer: COMMERCIAL

## 2021-05-28 DIAGNOSIS — Z34.90 PRENATAL CARE, ANTEPARTUM: ICD-10-CM

## 2021-05-28 DIAGNOSIS — O09.892 SUPERVISION OF OTHER HIGH RISK PREGNANCIES, SECOND TRIMESTER: ICD-10-CM

## 2021-05-28 DIAGNOSIS — Z86.39 HISTORY OF THYROID NODULE: ICD-10-CM

## 2021-05-28 LAB
ABO GROUP BLD: NORMAL
BLD GP AB SCN SERPL QL: NORMAL
RH BLD: NORMAL

## 2021-05-28 PROCEDURE — 76805 OB US >/= 14 WKS SNGL FETUS: CPT | Mod: TC | Performed by: NURSE PRACTITIONER

## 2021-05-29 LAB
ALBUMIN SERPL BCP-MCNC: 3.6 G/DL (ref 3.2–4.9)
ALBUMIN/GLOB SERPL: 1.1 G/DL
ALP SERPL-CCNC: 88 U/L (ref 30–99)
ALT SERPL-CCNC: 12 U/L (ref 2–50)
ANION GAP SERPL CALC-SCNC: 13 MMOL/L (ref 7–16)
APPEARANCE UR: CLEAR
AST SERPL-CCNC: 14 U/L (ref 12–45)
BASOPHILS # BLD AUTO: 0.3 % (ref 0–1.8)
BASOPHILS # BLD: 0.02 K/UL (ref 0–0.12)
BILIRUB SERPL-MCNC: 0.3 MG/DL (ref 0.1–1.5)
BILIRUB UR QL STRIP.AUTO: NEGATIVE
BUN SERPL-MCNC: 5 MG/DL (ref 8–22)
CALCIUM SERPL-MCNC: 9.4 MG/DL (ref 8.5–10.5)
CHLORIDE SERPL-SCNC: 106 MMOL/L (ref 96–112)
CO2 SERPL-SCNC: 18 MMOL/L (ref 20–33)
COLOR UR: YELLOW
CREAT SERPL-MCNC: 0.39 MG/DL (ref 0.5–1.4)
EOSINOPHIL # BLD AUTO: 0.02 K/UL (ref 0–0.51)
EOSINOPHIL NFR BLD: 0.3 % (ref 0–6.9)
ERYTHROCYTE [DISTWIDTH] IN BLOOD BY AUTOMATED COUNT: 45.1 FL (ref 35.9–50)
GLOBULIN SER CALC-MCNC: 3.3 G/DL (ref 1.9–3.5)
GLUCOSE SERPL-MCNC: 76 MG/DL (ref 65–99)
GLUCOSE UR STRIP.AUTO-MCNC: NEGATIVE MG/DL
HBV SURFACE AG SER QL: ABNORMAL
HCT VFR BLD AUTO: 38.3 % (ref 37–47)
HCV AB SER QL: NORMAL
HGB BLD-MCNC: 12.1 G/DL (ref 12–16)
HIV 1+2 AB+HIV1 P24 AG SERPL QL IA: NORMAL
IMM GRANULOCYTES # BLD AUTO: 0.07 K/UL (ref 0–0.11)
IMM GRANULOCYTES NFR BLD AUTO: 0.9 % (ref 0–0.9)
KETONES UR STRIP.AUTO-MCNC: NEGATIVE MG/DL
LEUKOCYTE ESTERASE UR QL STRIP.AUTO: NEGATIVE
LYMPHOCYTES # BLD AUTO: 2.04 K/UL (ref 1–4.8)
LYMPHOCYTES NFR BLD: 25.6 % (ref 22–41)
MCH RBC QN AUTO: 29.8 PG (ref 27–33)
MCHC RBC AUTO-ENTMCNC: 31.6 G/DL (ref 33.6–35)
MCV RBC AUTO: 94.3 FL (ref 81.4–97.8)
MICRO URNS: ABNORMAL
MONOCYTES # BLD AUTO: 0.55 K/UL (ref 0–0.85)
MONOCYTES NFR BLD AUTO: 6.9 % (ref 0–13.4)
NEUTROPHILS # BLD AUTO: 5.27 K/UL (ref 2–7.15)
NEUTROPHILS NFR BLD: 66 % (ref 44–72)
NITRITE UR QL STRIP.AUTO: NEGATIVE
NRBC # BLD AUTO: 0 K/UL
NRBC BLD-RTO: 0 /100 WBC
PH UR STRIP.AUTO: 7.5 [PH] (ref 5–8)
PLATELET # BLD AUTO: 241 K/UL (ref 164–446)
PMV BLD AUTO: 11.3 FL (ref 9–12.9)
POTASSIUM SERPL-SCNC: 4.2 MMOL/L (ref 3.6–5.5)
PROT SERPL-MCNC: 6.9 G/DL (ref 6–8.2)
PROT UR QL STRIP: NEGATIVE MG/DL
RBC # BLD AUTO: 4.06 M/UL (ref 4.2–5.4)
RBC UR QL AUTO: NEGATIVE
RUBV AB SER QL: 194 IU/ML
SODIUM SERPL-SCNC: 137 MMOL/L (ref 135–145)
SP GR UR STRIP.AUTO: 1.02
T4 FREE SERPL-MCNC: 0.78 NG/DL (ref 0.93–1.7)
TREPONEMA PALLIDUM IGG+IGM AB [PRESENCE] IN SERUM OR PLASMA BY IMMUNOASSAY: ABNORMAL
TSH SERPL DL<=0.005 MIU/L-ACNC: 1.46 UIU/ML (ref 0.38–5.33)
UROBILINOGEN UR STRIP.AUTO-MCNC: 1 MG/DL
WBC # BLD AUTO: 8 K/UL (ref 4.8–10.8)

## 2021-06-01 LAB
# FETUSES US: NORMAL
AFP MOM SERPL: NORMAL
AFP SERPL-MCNC: 137 NG/ML
AGE - REPORTED: 37.2 YR
CURRENT SMOKER: NO
FAMILY MEMBER DISEASES HX: NO
GA METHOD: NORMAL
GA: NORMAL WK
HCG MOM SERPL: NORMAL
HCG SERPL-ACNC: 7190 IU/L
HX OF HEREDITARY DISORDERS: NO
IDDM PATIENT QL: NO
INHIBIN A MOM SERPL: NORMAL
INHIBIN A SERPL-MCNC: 304 PG/ML
INTEGRATED SCN PATIENT-IMP: NORMAL
PATHOLOGY STUDY: NORMAL
SPECIMEN DRAWN SERPL: NORMAL
U ESTRIOL MOM SERPL: NORMAL
U ESTRIOL SERPL-MCNC: 5 NG/ML

## 2021-08-16 ENCOUNTER — ROUTINE PRENATAL (OUTPATIENT)
Dept: OBGYN | Facility: CLINIC | Age: 36
End: 2021-08-16

## 2021-08-16 VITALS — BODY MASS INDEX: 30.49 KG/M2 | SYSTOLIC BLOOD PRESSURE: 118 MMHG | DIASTOLIC BLOOD PRESSURE: 70 MMHG | WEIGHT: 188.9 LBS

## 2021-08-16 DIAGNOSIS — O26.843 UTERINE SIZE DATE DISCREPANCY PREGNANCY, THIRD TRIMESTER: ICD-10-CM

## 2021-08-16 DIAGNOSIS — O09.893 SUPERVISION OF OTHER HIGH RISK PREGNANCIES, THIRD TRIMESTER: Primary | ICD-10-CM

## 2021-08-16 DIAGNOSIS — Z91.199 NON-COMPLIANCE: ICD-10-CM

## 2021-08-16 PROCEDURE — 90040 PR PRENATAL FOLLOW UP: CPT | Performed by: NURSE PRACTITIONER

## 2021-08-16 ASSESSMENT — FIBROSIS 4 INDEX: FIB4 SCORE: 0.62

## 2021-08-16 NOTE — PROGRESS NOTES
No complaints today  Feeling tired and unwell from the pregnancy  Hasn't been seen in 12 weeks because she has been feeling so run down  Discussed importance of good consistent care  Reviewed labs and US  Declines Tdap  Signed for BTL  Glucose test ordered today, instructed to do this week  S>D by 4-5 weeks, growth US ordered  Labor precautions reviewed, all questions answered    Jessica MAHERM, APRN

## 2021-08-16 NOTE — LETTER
Cuente los Movimientos de sofia Bebé  Otro paso importante para la jose de sofia bebé    SangeetaMeg Larson     [facility]            Dept: [phone]    ¿Cuántas semanas tiene de embarazo? 34w2d    Fecha cuando tiene que comenzar a contar el movimiento: 08/16/2021                Go debe usar jovon diagrama    Angelita manera en que sofia doctor puede controlar a jose de sofia bebé es sabiendo cuantas veces se mueve sofia bebé en el útero, o por medio de las “pataditas”.  Usted podrá ayudarle a sofia médico al usar cada día el siguiente diagrama.    Cada día, usted debe prestar atención a cuantas horas le lleva a sofia bebé moverse 10 veces.  Comience a contar en la mañana, lo antes posible después de haberse levantado.    · Primeramente, escriba la hora en que se mueve sofia bebé, hasta llegar a 10 veces.  · Colóquele un check o palomita a cada cuadrito cada vez que sofia bebé se mueva hasta que complete 10 veces.  · Escriba la hora cuando termine de contar 10 veces en la última columna.  · Sume el total del tiempo que le llevó contar los 10 movimientos.  · Finalmente, complete el cuadrito de cuantas horas le llevó hacerlo.    Después de yoli contado los 10 movimientos, ya no tendrá que contar los demás movimientos por el vero del día.  A la mañana siguiente, comience a contar de nuevo cuantas veces se mueve el bebé desde el momento en que se levante.    ¿Qué tendría que considerarse un “movimiento”?  Es difícil de decirlo porque es distinto de angelita madre a otra, y de un embarazo a otro.  Lo importante es que cuente el movimiento de la misma manera amando el transcurso de sofia embarazo.  Si tiene preguntas adicionales, pregúntele a sofia doctor.    ¡Cuente cuidadosamente cada día!     MUESTRA:  Semana 28    ¿Cuántas horas le ha llevado sentir 10 movimientos?        Hora de Inicio     1     2     3     4     5     6     7     8     9     10   Hora de Finlizar   Ana. 8:20 ·  ·  ·  ·  ·  ·  ·  ·  ·  ·  11:40    Mar.               Mié.               Jue.               Vie.               Sáb.               Dom.                 IMPORTANTE:  Usted debe contactar a sofia doctor si le lleva más de 2 horas sentir 10 movimientos de sofia bebé.    Cada mañana, escriba la hora de inicio y comience a contar los movimientos de sofia bebé.  Hágalo colocándole un check o palomita a cada cuadrito cada vez que sienta un movimiento de sofia bebé.  Cuando haya sentido 10 “pataditas”, escriba la hora en que terminó de contar en la última columna.  Luego, complete en la cajita (arriba de la denia de check o palomita) el número total de horas que le llevó hacerlo.  Asegúrese de leer completamente las instrucciones en la página anterior.

## 2021-08-16 NOTE — NON-PROVIDER
Pt. Here for OB/FU. Reports Good FM.   Good # [phone]  Pt. Denies VB, LOF, or UC's.   Pharmacy verified.   Chaperone offered and not indicated  Pt states no concerns as of right now  JACKLYN sheet today  BTL signed   Tdap declined

## 2021-08-17 ENCOUNTER — APPOINTMENT (OUTPATIENT)
Dept: RADIOLOGY | Facility: MEDICAL CENTER | Age: 36
End: 2021-08-17
Attending: NURSE PRACTITIONER
Payer: MEDICAID

## 2021-08-17 ENCOUNTER — APPOINTMENT (OUTPATIENT)
Dept: RADIOLOGY | Facility: MEDICAL CENTER | Age: 36
End: 2021-08-17
Attending: EMERGENCY MEDICINE

## 2021-08-17 ENCOUNTER — HOSPITAL ENCOUNTER (EMERGENCY)
Facility: MEDICAL CENTER | Age: 36
End: 2021-08-17
Attending: OBSTETRICS & GYNECOLOGY | Admitting: OBSTETRICS & GYNECOLOGY
Payer: MEDICAID

## 2021-08-17 ENCOUNTER — APPOINTMENT (OUTPATIENT)
Dept: RADIOLOGY | Facility: MEDICAL CENTER | Age: 36
End: 2021-08-17
Attending: EMERGENCY MEDICINE
Payer: COMMERCIAL

## 2021-08-17 ENCOUNTER — HOSPITAL ENCOUNTER (EMERGENCY)
Facility: MEDICAL CENTER | Age: 36
End: 2021-08-17
Attending: EMERGENCY MEDICINE
Payer: COMMERCIAL

## 2021-08-17 VITALS
OXYGEN SATURATION: 97 % | WEIGHT: 180 LBS | SYSTOLIC BLOOD PRESSURE: 118 MMHG | HEIGHT: 64 IN | TEMPERATURE: 97.5 F | DIASTOLIC BLOOD PRESSURE: 56 MMHG | BODY MASS INDEX: 30.73 KG/M2 | RESPIRATION RATE: 18 BRPM | HEART RATE: 102 BPM

## 2021-08-17 VITALS
SYSTOLIC BLOOD PRESSURE: 97 MMHG | RESPIRATION RATE: 18 BRPM | DIASTOLIC BLOOD PRESSURE: 55 MMHG | HEART RATE: 90 BPM | HEIGHT: 66 IN | TEMPERATURE: 96.9 F | BODY MASS INDEX: 30.36 KG/M2 | WEIGHT: 188.93 LBS

## 2021-08-17 DIAGNOSIS — Z3A.34 34 WEEKS GESTATION OF PREGNANCY: ICD-10-CM

## 2021-08-17 DIAGNOSIS — T74.91XA DOMESTIC VIOLENCE OF ADULT, INITIAL ENCOUNTER: ICD-10-CM

## 2021-08-17 LAB
ABO GROUP BLD: NORMAL
ALBUMIN SERPL BCP-MCNC: 3.5 G/DL (ref 3.2–4.9)
ALBUMIN/GLOB SERPL: 1.1 G/DL
ALP SERPL-CCNC: 156 U/L (ref 30–99)
ALT SERPL-CCNC: 16 U/L (ref 2–50)
ANION GAP SERPL CALC-SCNC: 14 MMOL/L (ref 7–16)
APTT PPP: 29.6 SEC (ref 24.7–36)
AST SERPL-CCNC: 22 U/L (ref 12–45)
BILIRUB SERPL-MCNC: 0.4 MG/DL (ref 0.1–1.5)
BLD GP AB SCN SERPL QL: NORMAL
BUN SERPL-MCNC: 6 MG/DL (ref 8–22)
CALCIUM SERPL-MCNC: 9.2 MG/DL (ref 8.5–10.5)
CHLORIDE SERPL-SCNC: 105 MMOL/L (ref 96–112)
CO2 SERPL-SCNC: 18 MMOL/L (ref 20–33)
CREAT SERPL-MCNC: 0.49 MG/DL (ref 0.5–1.4)
ERYTHROCYTE [DISTWIDTH] IN BLOOD BY AUTOMATED COUNT: 46.4 FL (ref 35.9–50)
ETHANOL BLD-MCNC: <10.1 MG/DL (ref 0–10)
GLOBULIN SER CALC-MCNC: 3.2 G/DL (ref 1.9–3.5)
GLUCOSE SERPL-MCNC: 104 MG/DL (ref 65–99)
HCG SERPL QL: POSITIVE
HCT VFR BLD AUTO: 37.5 % (ref 37–47)
HGB BLD-MCNC: 12.5 G/DL (ref 12–16)
INR PPP: 1.02 (ref 0.87–1.13)
MCH RBC QN AUTO: 29.1 PG (ref 27–33)
MCHC RBC AUTO-ENTMCNC: 33.3 G/DL (ref 33.6–35)
MCV RBC AUTO: 87.4 FL (ref 81.4–97.8)
PLATELET # BLD AUTO: 233 K/UL (ref 164–446)
PMV BLD AUTO: 11.3 FL (ref 9–12.9)
POTASSIUM SERPL-SCNC: 3.4 MMOL/L (ref 3.6–5.5)
PROT SERPL-MCNC: 6.7 G/DL (ref 6–8.2)
PROTHROMBIN TIME: 13.1 SEC (ref 12–14.6)
RBC # BLD AUTO: 4.29 M/UL (ref 4.2–5.4)
RH BLD: NORMAL
SODIUM SERPL-SCNC: 137 MMOL/L (ref 135–145)
WBC # BLD AUTO: 11.7 K/UL (ref 4.8–10.8)

## 2021-08-17 PROCEDURE — 99284 EMERGENCY DEPT VISIT MOD MDM: CPT

## 2021-08-17 PROCEDURE — 86850 RBC ANTIBODY SCREEN: CPT

## 2021-08-17 PROCEDURE — 302449 STATCHG TRIAGE ONLY (STATISTIC)

## 2021-08-17 PROCEDURE — 85027 COMPLETE CBC AUTOMATED: CPT

## 2021-08-17 PROCEDURE — 84703 CHORIONIC GONADOTROPIN ASSAY: CPT

## 2021-08-17 PROCEDURE — 76815 OB US LIMITED FETUS(S): CPT

## 2021-08-17 PROCEDURE — 85610 PROTHROMBIN TIME: CPT

## 2021-08-17 PROCEDURE — 700102 HCHG RX REV CODE 250 W/ 637 OVERRIDE(OP): Performed by: EMERGENCY MEDICINE

## 2021-08-17 PROCEDURE — 73600 X-RAY EXAM OF ANKLE: CPT | Mod: LT

## 2021-08-17 PROCEDURE — 73551 X-RAY EXAM OF FEMUR 1: CPT | Mod: RT

## 2021-08-17 PROCEDURE — 86900 BLOOD TYPING SEROLOGIC ABO: CPT

## 2021-08-17 PROCEDURE — 305948 HCHG GREEN TRAUMA ACT PRE-NOTIFY NO CC

## 2021-08-17 PROCEDURE — 59025 FETAL NON-STRESS TEST: CPT

## 2021-08-17 PROCEDURE — 85730 THROMBOPLASTIN TIME PARTIAL: CPT

## 2021-08-17 PROCEDURE — 73600 X-RAY EXAM OF ANKLE: CPT | Mod: RT

## 2021-08-17 PROCEDURE — 73501 X-RAY EXAM HIP UNI 1 VIEW: CPT | Mod: LT

## 2021-08-17 PROCEDURE — A9270 NON-COVERED ITEM OR SERVICE: HCPCS | Performed by: EMERGENCY MEDICINE

## 2021-08-17 PROCEDURE — 86901 BLOOD TYPING SEROLOGIC RH(D): CPT

## 2021-08-17 PROCEDURE — 80053 COMPREHEN METABOLIC PANEL: CPT

## 2021-08-17 PROCEDURE — 82077 ASSAY SPEC XCP UR&BREATH IA: CPT

## 2021-08-17 RX ORDER — ACETAMINOPHEN 500 MG
1000 TABLET ORAL ONCE
Status: COMPLETED | OUTPATIENT
Start: 2021-08-17 | End: 2021-08-17

## 2021-08-17 RX ADMIN — ACETAMINOPHEN 1000 MG: 500 TABLET ORAL at 02:15

## 2021-08-17 ASSESSMENT — FIBROSIS 4 INDEX: FIB4 SCORE: 0.62
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
## 2021-08-19 ENCOUNTER — APPOINTMENT (OUTPATIENT)
Dept: RADIOLOGY | Facility: IMAGING CENTER | Age: 36
End: 2021-08-19
Attending: NURSE PRACTITIONER

## 2021-08-19 DIAGNOSIS — O26.843 UTERINE SIZE DATE DISCREPANCY PREGNANCY, THIRD TRIMESTER: ICD-10-CM

## 2021-08-19 PROCEDURE — 76816 OB US FOLLOW-UP PER FETUS: CPT | Mod: TC | Performed by: NURSE PRACTITIONER

## 2021-08-20 ENCOUNTER — HOSPITAL ENCOUNTER (OUTPATIENT)
Dept: LAB | Facility: MEDICAL CENTER | Age: 36
End: 2021-08-20
Attending: NURSE PRACTITIONER
Payer: COMMERCIAL

## 2021-08-20 DIAGNOSIS — O09.893 SUPERVISION OF OTHER HIGH RISK PREGNANCIES, THIRD TRIMESTER: ICD-10-CM

## 2021-08-20 LAB
ERYTHROCYTE [DISTWIDTH] IN BLOOD BY AUTOMATED COUNT: 49.1 FL (ref 35.9–50)
GLUCOSE 1H P 50 G GLC PO SERPL-MCNC: 140 MG/DL (ref 70–139)
HCT VFR BLD AUTO: 38.4 % (ref 37–47)
HGB BLD-MCNC: 12.3 G/DL (ref 12–16)
MCH RBC QN AUTO: 29.1 PG (ref 27–33)
MCHC RBC AUTO-ENTMCNC: 32 G/DL (ref 33.6–35)
MCV RBC AUTO: 90.8 FL (ref 81.4–97.8)
PLATELET # BLD AUTO: 211 K/UL (ref 164–446)
PMV BLD AUTO: 11.9 FL (ref 9–12.9)
RBC # BLD AUTO: 4.23 M/UL (ref 4.2–5.4)
TREPONEMA PALLIDUM IGG+IGM AB [PRESENCE] IN SERUM OR PLASMA BY IMMUNOASSAY: NORMAL
WBC # BLD AUTO: 7.5 K/UL (ref 4.8–10.8)

## 2021-08-24 DIAGNOSIS — R73.09 ELEVATED GLUCOSE: Primary | ICD-10-CM

## 2021-08-25 ENCOUNTER — TELEPHONE (OUTPATIENT)
Dept: OBGYN | Facility: CLINIC | Age: 36
End: 2021-08-25

## 2021-08-25 NOTE — TELEPHONE ENCOUNTER
----- Message from Jessica Chávez C.N.M. sent at 8/24/2021 11:41 AM PDT -----  Please call patient with results, she needs to do 3 hour glucose ASAP. Orders placed      8/25/2021 1556 Called pt but no answer and unable to leave . VM not set up yet. Will try again later  8/27/2021 1136 Pt notified of abnormal 1hr gtt and need to do 3hr gtt this time. Pt instructed to fast 10-12hrs  prior to testing. Pt informed she is only allow to drink plain water during fasting time. Pt will call lab to schedule an appt. Advised to bring a snack for after the test is done. Pt notified will be staying in the labs for the 3hr. Pt agreed to do it Monday 8/30/2021. Pt verbalized understanding.

## 2021-08-30 ENCOUNTER — HOSPITAL ENCOUNTER (OUTPATIENT)
Dept: LAB | Facility: MEDICAL CENTER | Age: 36
End: 2021-08-30
Attending: NURSE PRACTITIONER
Payer: COMMERCIAL

## 2021-08-30 DIAGNOSIS — R73.09 ELEVATED GLUCOSE: ICD-10-CM

## 2021-08-30 LAB
GLUCOSE 1H P CHAL SERPL-MCNC: 162 MG/DL (ref 65–180)
GLUCOSE 2H P CHAL SERPL-MCNC: 116 MG/DL (ref 65–155)
GLUCOSE 3H P CHAL SERPL-MCNC: 105 MG/DL (ref 65–140)
GLUCOSE BS SERPL-MCNC: 88 MG/DL (ref 65–95)

## 2021-08-31 ENCOUNTER — HOSPITAL ENCOUNTER (OUTPATIENT)
Facility: MEDICAL CENTER | Age: 36
End: 2021-08-31
Attending: NURSE PRACTITIONER
Payer: COMMERCIAL

## 2021-08-31 ENCOUNTER — ROUTINE PRENATAL (OUTPATIENT)
Dept: OBGYN | Facility: CLINIC | Age: 36
End: 2021-08-31

## 2021-08-31 VITALS — WEIGHT: 191 LBS | BODY MASS INDEX: 30.83 KG/M2 | SYSTOLIC BLOOD PRESSURE: 108 MMHG | DIASTOLIC BLOOD PRESSURE: 60 MMHG

## 2021-08-31 DIAGNOSIS — O09.893 SUPERVISION OF OTHER HIGH RISK PREGNANCIES, THIRD TRIMESTER: Primary | ICD-10-CM

## 2021-08-31 DIAGNOSIS — O09.893 SUPERVISION OF OTHER HIGH RISK PREGNANCIES, THIRD TRIMESTER: ICD-10-CM

## 2021-08-31 PROCEDURE — 90040 PR PRENATAL FOLLOW UP: CPT | Performed by: NURSE PRACTITIONER

## 2021-08-31 ASSESSMENT — FIBROSIS 4 INDEX: FIB4 SCORE: 0.96

## 2021-08-31 NOTE — PROGRESS NOTES
Complains of irregular UC's. Labor precautions reviewed  GBS today  All labs and US WNL  Will place IOL referral today for 39 weeks secondary to AMA  Needs weekly NST, will do later this week  All questions answered    Jessica MAHERM, APRN

## 2021-08-31 NOTE — NON-PROVIDER
Pt is 36.3 today, has irregular ctx, denies LOF, vaginal bleeding, has +FM. Pt reports headaches and dizziness this weekend, pt states she has been outdoors often and not wearing a mask, encouraged mask wear when outdoors due to air quality. Pt also states she does not drink enough water only 1-2 16 oz. Bottles. Recommended to increase water intake to at least 3L daily. Fundal height 38 cm, . Informed pt will require weekly NSTs starting this week and will send IOL referral for 39 weeks. GBS collected today. Provided labor precautions. RTC this week for NST and next week for prenatal and NST visit.

## 2021-09-02 ENCOUNTER — ROUTINE PRENATAL (OUTPATIENT)
Dept: OBGYN | Facility: CLINIC | Age: 36
End: 2021-09-02

## 2021-09-02 DIAGNOSIS — O09.893 SUPERVISION OF OTHER HIGH RISK PREGNANCIES, THIRD TRIMESTER: ICD-10-CM

## 2021-09-02 LAB
GP B STREP DNA SPEC QL NAA+PROBE: NEGATIVE
NST ACOUSTIC STIMULATION: NORMAL
NST ACTION NECESSARY: NORMAL
NST ASSESSMENT: NORMAL
NST BASELINE: NORMAL
NST INDICATIONS: NORMAL
NST OTHER DATA: NORMAL
NST READ BY: NORMAL
NST RETURN: NORMAL
NST UTERINE ACTIVITY: NORMAL

## 2021-09-08 ENCOUNTER — NON-PROVIDER VISIT (OUTPATIENT)
Dept: OBGYN | Facility: CLINIC | Age: 36
End: 2021-09-08

## 2021-09-08 ENCOUNTER — ROUTINE PRENATAL (OUTPATIENT)
Dept: OBGYN | Facility: CLINIC | Age: 36
End: 2021-09-08

## 2021-09-08 VITALS — WEIGHT: 194 LBS | SYSTOLIC BLOOD PRESSURE: 110 MMHG | DIASTOLIC BLOOD PRESSURE: 65 MMHG | BODY MASS INDEX: 31.31 KG/M2

## 2021-09-08 DIAGNOSIS — O09.522 MULTIGRAVIDA OF ADVANCED MATERNAL AGE IN SECOND TRIMESTER: ICD-10-CM

## 2021-09-08 LAB
NST ACOUSTIC STIMULATION: NORMAL
NST ACTION NECESSARY: NORMAL
NST ASSESSMENT: NORMAL
NST BASELINE: NORMAL
NST INDICATIONS: NORMAL
NST OTHER DATA: NORMAL
NST READ BY: NORMAL
NST RETURN: NORMAL
NST UTERINE ACTIVITY: NORMAL

## 2021-09-08 PROCEDURE — 90040 PR PRENATAL FOLLOW UP: CPT | Performed by: NURSE PRACTITIONER

## 2021-09-08 ASSESSMENT — FIBROSIS 4 INDEX: FIB4 SCORE: 0.96

## 2021-09-08 NOTE — PROGRESS NOTES
SUBJECTIVE:  Pt is a 36 y.o.   at 37w4d  gestation. Presents today for follow-up prenatal care. Reports no issues at this time.  Reports good  fetal movement. Denies regular cramping/contractions, bleeding or leaking of fluid. Denies dysuria, headaches, N/V. Generally feels well today except reports contractions every 8 minutes since the weekend that stop and start.     OBJECTIVE:  - See prenatal vitals flow  -   Vitals:    21 1007   BP: 110/65   Weight: 88 kg (194 lb)                 ASSESSMENT:   - IUP at 37w4d    - S=D   - 450/-2  Patient Active Problem List    Diagnosis Date Noted   • Supervision of other high risk pregnancies, third trimester 2021   • Cholelithiasis 2021   • History of thyroid nodule 2019   • History of macrosomia in infant in prior pregnancy - [id number] x 1 2019   • Advanced maternal age in multigravida 2019         PLAN:  - S/sx pregnancy and labor warning signs vs general discomforts discussed  - Fetal movements and/or kick counts reviewed   - Adequate hydration reinforced  - Nutrition/exercise/vitamin education; continue PNV  - Plans implant for contraception Pp  - Reviewed importance of not staying too long at home if contractions get stronger or any more closer together to go to LnD due to advanced dilation   - Reviewed COVID-19 vaccination recommendations: pt reports she was going to wait until after delivery but may get it asap  - Anticipatory guidance given  - RTC in 1 weeks for follow-up prenatal care  - IOL placed for 39 weeks   - NST reactive today and for one NST next week

## 2021-09-08 NOTE — PROGRESS NOTES
Pt. Here for OB/FU and NST. Reports Good FM.   Good # [phone]  Pt states since Saturday has been having more consistent contractions.   Pharmacy verified.   Chaperone offered and not needed.   GBS negative

## 2021-09-08 NOTE — LETTER
Cuente los Movimientos de sofia Bebé  Otro paso importante para la jose de sofia bebé    SangeetaMeg Larson     [facility]            Dept: [phone]    ¿Cuántas semanas tiene de embarazo? 37w4d    Fecha cuando tiene que comenzar a contar el movimiento: Hoy                  Go debe usar jovon diagrama    Angelita manera en que sofia doctor puede controlar a jose de sofia bebé es sabiendo cuantas veces se mueve sofia bebé en el útero, o por medio de las “pataditas”.  Usted podrá ayudarle a sofia médico al usar cada día el siguiente diagrama.    Cada día, usted debe prestar atención a cuantas horas le lleva a sofia bebé moverse 10 veces.  Comience a contar en la mañana, lo antes posible después de haberse levantado.    · Primeramente, escriba la hora en que se mueve sofia bebé, hasta llegar a 10 veces.  · Colóquele un check o palomita a cada cuadrito cada vez que sofia bebé se mueva hasta que complete 10 veces.  · Escriba la hora cuando termine de contar 10 veces en la última columna.  · Sume el total del tiempo que le llevó contar los 10 movimientos.  · Finalmente, complete el cuadrito de cuantas horas le llevó hacerlo.    Después de yoli contado los 10 movimientos, ya no tendrá que contar los demás movimientos por el vero del día.  A la mañana siguiente, comience a contar de nuevo cuantas veces se mueve el bebé desde el momento en que se levante.    ¿Qué tendría que considerarse un “movimiento”?  Es difícil de decirlo porque es distinto de angelita madre a otra, y de un embarazo a otro.  Lo importante es que cuente el movimiento de la misma manera amando el transcurso de sofia embarazo.  Si tiene preguntas adicionales, pregúntele a sofia doctor.    ¡Cuente cuidadosamente cada día!     MUESTRA:  Semana 28    ¿Cuántas horas le ha llevado sentir 10 movimientos?        Hora de Inicio     1     2     3     4     5     6     7     8     9     10   Hora de Finlizar   Ana. 8:20 ·  ·  ·  ·  ·  ·  ·  ·  ·  ·  11:40   Mar.                Mié.               Jue.               Vie.               Sáb.               Dom.                 IMPORTANTE:  Usted debe contactar a sofia doctor si le lleva más de 2 horas sentir 10 movimientos de sofia bebé.    Cada mañana, escriba la hora de inicio y comience a contar los movimientos de sofia bebé.  Hágalo colocándole un check o palomita a cada cuadrito cada vez que sienta un movimiento de sofia bebé.  Cuando haya sentido 10 “pataditas”, escriba la hora en que terminó de contar en la última columna.  Luego, complete en la cajita (arriba de la denia de check o palomita) el número total de horas que le llevó hacerlo.  Asegúrese de leer completamente las instrucciones en la página anterior.

## 2021-09-11 ENCOUNTER — HOSPITAL ENCOUNTER (EMERGENCY)
Facility: MEDICAL CENTER | Age: 36
End: 2021-09-12
Attending: OBSTETRICS & GYNECOLOGY | Admitting: OBSTETRICS & GYNECOLOGY
Payer: COMMERCIAL

## 2021-09-11 VITALS
SYSTOLIC BLOOD PRESSURE: 114 MMHG | HEIGHT: 66 IN | WEIGHT: 194 LBS | BODY MASS INDEX: 31.18 KG/M2 | HEART RATE: 95 BPM | TEMPERATURE: 97.7 F | DIASTOLIC BLOOD PRESSURE: 70 MMHG | RESPIRATION RATE: 17 BRPM

## 2021-09-11 PROCEDURE — 302449 STATCHG TRIAGE ONLY (STATISTIC)

## 2021-09-11 ASSESSMENT — FIBROSIS 4 INDEX: FIB4 SCORE: 0.96

## 2021-09-12 PROCEDURE — 59025 FETAL NON-STRESS TEST: CPT

## 2021-09-14 ENCOUNTER — ROUTINE PRENATAL (OUTPATIENT)
Dept: OBGYN | Facility: CLINIC | Age: 36
End: 2021-09-14

## 2021-09-14 ENCOUNTER — ROUTINE PRENATAL (OUTPATIENT)
Dept: OBGYN | Facility: CLINIC | Age: 36
End: 2021-09-14
Payer: MEDICAID

## 2021-09-14 VITALS — BODY MASS INDEX: 31.47 KG/M2 | WEIGHT: 195 LBS | DIASTOLIC BLOOD PRESSURE: 57 MMHG | SYSTOLIC BLOOD PRESSURE: 107 MMHG

## 2021-09-14 DIAGNOSIS — Z64.1 GRAND MULTIPARA: Primary | ICD-10-CM

## 2021-09-14 DIAGNOSIS — O09.523 MULTIGRAVIDA OF ADVANCED MATERNAL AGE IN THIRD TRIMESTER: ICD-10-CM

## 2021-09-14 DIAGNOSIS — O09.522 MULTIGRAVIDA OF ADVANCED MATERNAL AGE IN SECOND TRIMESTER: ICD-10-CM

## 2021-09-14 PROCEDURE — 59025 FETAL NON-STRESS TEST: CPT | Performed by: NURSE PRACTITIONER

## 2021-09-14 PROCEDURE — 90040 PR PRENATAL FOLLOW UP: CPT | Performed by: NURSE PRACTITIONER

## 2021-09-14 ASSESSMENT — FIBROSIS 4 INDEX: FIB4 SCORE: 0.96

## 2021-09-14 NOTE — PROGRESS NOTES
SUBJECTIVE:  Pt is a 36 y.o.   at 38w3d  gestation. Presents today for follow-up prenatal care. Reports no issues at this time.  Reports fetal movement. Denies regular cramping/contractions, bleeding or leaking of fluid. Denies dysuria, headaches, N/V. Generally feels well today.     OBJECTIVE:  - See prenatal vitals flow  -   Vitals:    21 1416   BP: 107/57   Weight: 88.5 kg (195 lb)                 ASSESSMENT:   - IUP at 38w3d    - S=D   -   Patient Active Problem List    Diagnosis Date Noted   • Grand multipara 2021   • Cholelithiasis 2021   • History of thyroid nodule 2019   • History of macrosomia in infant in prior pregnancy - [id number] x 1 2019   • Advanced maternal age in multigravida 2019         PLAN:  - S/sx pregnancy and labor warning signs vs general discomforts discussed  - Fetal movements and/or kick counts reviewed   - Adequate hydration reinforced  - Nutrition/exercise/vitamin education; continue PNV  - IOL on   - If any contractions, LOF, bleeding or decreased FM pt to report to hospital immediately due to gran multipara status and advanced dilation   - NST reactive today   - Anticipatory guidance given  - RTC PRN

## 2021-09-18 ENCOUNTER — ANESTHESIA (OUTPATIENT)
Dept: ANESTHESIOLOGY | Facility: MEDICAL CENTER | Age: 36
End: 2021-09-18
Payer: MEDICAID

## 2021-09-18 ENCOUNTER — ANESTHESIA EVENT (OUTPATIENT)
Dept: ANESTHESIOLOGY | Facility: MEDICAL CENTER | Age: 36
End: 2021-09-18
Payer: MEDICAID

## 2021-09-18 ENCOUNTER — HOSPITAL ENCOUNTER (INPATIENT)
Facility: MEDICAL CENTER | Age: 36
LOS: 2 days | End: 2021-09-20
Attending: OBSTETRICS & GYNECOLOGY | Admitting: OBSTETRICS & GYNECOLOGY
Payer: MEDICAID

## 2021-09-18 ENCOUNTER — APPOINTMENT (OUTPATIENT)
Dept: OBGYN | Facility: MEDICAL CENTER | Age: 36
End: 2021-09-18
Attending: OBSTETRICS & GYNECOLOGY
Payer: MEDICAID

## 2021-09-18 LAB
BACTERIA GENITAL QL WET PREP: NORMAL
BASOPHILS # BLD AUTO: 0.2 % (ref 0–1.8)
BASOPHILS # BLD: 0.02 K/UL (ref 0–0.12)
EOSINOPHIL # BLD AUTO: 0.02 K/UL (ref 0–0.51)
EOSINOPHIL NFR BLD: 0.2 % (ref 0–6.9)
ERYTHROCYTE [DISTWIDTH] IN BLOOD BY AUTOMATED COUNT: 48 FL (ref 35.9–50)
HCT VFR BLD AUTO: 39.9 % (ref 37–47)
HGB BLD-MCNC: 13.4 G/DL (ref 12–16)
HOLDING TUBE BB 8507: NORMAL
IMM GRANULOCYTES # BLD AUTO: 0.07 K/UL (ref 0–0.11)
IMM GRANULOCYTES NFR BLD AUTO: 0.8 % (ref 0–0.9)
LYMPHOCYTES # BLD AUTO: 1.85 K/UL (ref 1–4.8)
LYMPHOCYTES NFR BLD: 21.6 % (ref 22–41)
MCH RBC QN AUTO: 29.1 PG (ref 27–33)
MCHC RBC AUTO-ENTMCNC: 33.6 G/DL (ref 33.6–35)
MCV RBC AUTO: 86.7 FL (ref 81.4–97.8)
MONOCYTES # BLD AUTO: 0.59 K/UL (ref 0–0.85)
MONOCYTES NFR BLD AUTO: 6.9 % (ref 0–13.4)
NEUTROPHILS # BLD AUTO: 6.03 K/UL (ref 2–7.15)
NEUTROPHILS NFR BLD: 70.3 % (ref 44–72)
NRBC # BLD AUTO: 0 K/UL
NRBC BLD-RTO: 0 /100 WBC
PLATELET # BLD AUTO: 216 K/UL (ref 164–446)
PMV BLD AUTO: 11.8 FL (ref 9–12.9)
RBC # BLD AUTO: 4.6 M/UL (ref 4.2–5.4)
SARS-COV+SARS-COV-2 AG RESP QL IA.RAPID: NOTDETECTED
SIGNIFICANT IND 70042: NORMAL
SITE SITE: NORMAL
SOURCE SOURCE: NORMAL
SPECIMEN SOURCE: NORMAL
WBC # BLD AUTO: 8.6 K/UL (ref 4.8–10.8)

## 2021-09-18 PROCEDURE — 85025 COMPLETE CBC W/AUTO DIFF WBC: CPT

## 2021-09-18 PROCEDURE — 3E033VJ INTRODUCTION OF OTHER HORMONE INTO PERIPHERAL VEIN, PERCUTANEOUS APPROACH: ICD-10-PCS | Performed by: OBSTETRICS & GYNECOLOGY

## 2021-09-18 PROCEDURE — 10907ZC DRAINAGE OF AMNIOTIC FLUID, THERAPEUTIC FROM PRODUCTS OF CONCEPTION, VIA NATURAL OR ARTIFICIAL OPENING: ICD-10-PCS | Performed by: OBSTETRICS & GYNECOLOGY

## 2021-09-18 PROCEDURE — 700102 HCHG RX REV CODE 250 W/ 637 OVERRIDE(OP): Performed by: OBSTETRICS & GYNECOLOGY

## 2021-09-18 PROCEDURE — 700111 HCHG RX REV CODE 636 W/ 250 OVERRIDE (IP): Performed by: ANESTHESIOLOGY

## 2021-09-18 PROCEDURE — 700105 HCHG RX REV CODE 258: Performed by: OBSTETRICS & GYNECOLOGY

## 2021-09-18 PROCEDURE — 303615 HCHG EPIDURAL/SPINAL ANESTHESIA FOR LABOR

## 2021-09-18 PROCEDURE — 700101 HCHG RX REV CODE 250: Performed by: ANESTHESIOLOGY

## 2021-09-18 PROCEDURE — A9270 NON-COVERED ITEM OR SERVICE: HCPCS | Performed by: OBSTETRICS & GYNECOLOGY

## 2021-09-18 PROCEDURE — 59409 OBSTETRICAL CARE: CPT

## 2021-09-18 PROCEDURE — 700111 HCHG RX REV CODE 636 W/ 250 OVERRIDE (IP)

## 2021-09-18 PROCEDURE — 770002 HCHG ROOM/CARE - OB PRIVATE (112)

## 2021-09-18 PROCEDURE — 700111 HCHG RX REV CODE 636 W/ 250 OVERRIDE (IP): Performed by: OBSTETRICS & GYNECOLOGY

## 2021-09-18 PROCEDURE — 87426 SARSCOV CORONAVIRUS AG IA: CPT

## 2021-09-18 PROCEDURE — 304965 HCHG RECOVERY SERVICES

## 2021-09-18 PROCEDURE — 36415 COLL VENOUS BLD VENIPUNCTURE: CPT

## 2021-09-18 PROCEDURE — 59409 OBSTETRICAL CARE: CPT | Performed by: NURSE PRACTITIONER

## 2021-09-18 RX ORDER — BUPIVACAINE HYDROCHLORIDE 2.5 MG/ML
INJECTION, SOLUTION EPIDURAL; INFILTRATION; INTRACAUDAL
Status: COMPLETED
Start: 2021-09-18 | End: 2021-09-18

## 2021-09-18 RX ORDER — ROPIVACAINE HYDROCHLORIDE 2 MG/ML
INJECTION, SOLUTION EPIDURAL; INFILTRATION; PERINEURAL CONTINUOUS
Status: DISCONTINUED | OUTPATIENT
Start: 2021-09-18 | End: 2021-09-19 | Stop reason: HOSPADM

## 2021-09-18 RX ORDER — LIDOCAINE HYDROCHLORIDE AND EPINEPHRINE 15; 5 MG/ML; UG/ML
INJECTION, SOLUTION EPIDURAL PRN
Status: DISCONTINUED | OUTPATIENT
Start: 2021-09-18 | End: 2021-09-18 | Stop reason: SURG

## 2021-09-18 RX ORDER — SODIUM CHLORIDE, SODIUM LACTATE, POTASSIUM CHLORIDE, CALCIUM CHLORIDE 600; 310; 30; 20 MG/100ML; MG/100ML; MG/100ML; MG/100ML
INJECTION, SOLUTION INTRAVENOUS CONTINUOUS
Status: ACTIVE | OUTPATIENT
Start: 2021-09-18 | End: 2021-09-18

## 2021-09-18 RX ORDER — IBUPROFEN 800 MG/1
800 TABLET ORAL EVERY 8 HOURS
Status: DISCONTINUED | OUTPATIENT
Start: 2021-09-18 | End: 2021-09-20 | Stop reason: HOSPADM

## 2021-09-18 RX ORDER — METHYLERGONOVINE MALEATE 0.2 MG/ML
INJECTION INTRAVENOUS
Status: COMPLETED
Start: 2021-09-18 | End: 2021-09-18

## 2021-09-18 RX ORDER — METHYLERGONOVINE MALEATE 0.2 MG/ML
0.2 INJECTION INTRAVENOUS ONCE
Status: COMPLETED | OUTPATIENT
Start: 2021-09-18 | End: 2021-09-18

## 2021-09-18 RX ORDER — SODIUM CHLORIDE, SODIUM LACTATE, POTASSIUM CHLORIDE, AND CALCIUM CHLORIDE .6; .31; .03; .02 G/100ML; G/100ML; G/100ML; G/100ML
250 INJECTION, SOLUTION INTRAVENOUS PRN
Status: DISCONTINUED | OUTPATIENT
Start: 2021-09-18 | End: 2021-09-19 | Stop reason: HOSPADM

## 2021-09-18 RX ORDER — ROPIVACAINE HYDROCHLORIDE 2 MG/ML
INJECTION, SOLUTION EPIDURAL; INFILTRATION; PERINEURAL
Status: COMPLETED
Start: 2021-09-18 | End: 2021-09-18

## 2021-09-18 RX ORDER — SODIUM CHLORIDE, SODIUM LACTATE, POTASSIUM CHLORIDE, AND CALCIUM CHLORIDE .6; .31; .03; .02 G/100ML; G/100ML; G/100ML; G/100ML
1000 INJECTION, SOLUTION INTRAVENOUS
Status: DISCONTINUED | OUTPATIENT
Start: 2021-09-18 | End: 2021-09-19 | Stop reason: HOSPADM

## 2021-09-18 RX ORDER — ACETAMINOPHEN 500 MG
1000 TABLET ORAL EVERY 6 HOURS
Status: DISCONTINUED | OUTPATIENT
Start: 2021-09-19 | End: 2021-09-20 | Stop reason: HOSPADM

## 2021-09-18 RX ORDER — ONDANSETRON 2 MG/ML
INJECTION INTRAMUSCULAR; INTRAVENOUS
Status: COMPLETED
Start: 2021-09-18 | End: 2021-09-18

## 2021-09-18 RX ORDER — MISOPROSTOL 200 UG/1
800 TABLET ORAL
Status: COMPLETED | OUTPATIENT
Start: 2021-09-18 | End: 2021-09-18

## 2021-09-18 RX ORDER — BUPIVACAINE HYDROCHLORIDE 2.5 MG/ML
INJECTION, SOLUTION EPIDURAL; INFILTRATION; INTRACAUDAL PRN
Status: DISCONTINUED | OUTPATIENT
Start: 2021-09-18 | End: 2021-09-18 | Stop reason: SURG

## 2021-09-18 RX ORDER — HYDROXYZINE 50 MG/1
50 TABLET, FILM COATED ORAL EVERY 6 HOURS PRN
Status: DISCONTINUED | OUTPATIENT
Start: 2021-09-18 | End: 2021-09-19 | Stop reason: HOSPADM

## 2021-09-18 RX ADMIN — OXYTOCIN 125 ML/HR: 10 INJECTION, SOLUTION INTRAMUSCULAR; INTRAVENOUS at 20:52

## 2021-09-18 RX ADMIN — FENTANYL CITRATE 100 MCG: 50 INJECTION, SOLUTION INTRAMUSCULAR; INTRAVENOUS at 18:26

## 2021-09-18 RX ADMIN — METHYLERGONOVINE MALEATE 0.2 MG: 0.2 INJECTION INTRAVENOUS at 22:59

## 2021-09-18 RX ADMIN — ONDANSETRON 4 MG: 2 INJECTION INTRAMUSCULAR; INTRAVENOUS at 23:59

## 2021-09-18 RX ADMIN — LIDOCAINE HYDROCHLORIDE,EPINEPHRINE BITARTRATE 3 ML: 15; .005 INJECTION, SOLUTION EPIDURAL; INFILTRATION; INTRACAUDAL; PERINEURAL at 18:22

## 2021-09-18 RX ADMIN — ROPIVACAINE HYDROCHLORIDE 200 MG: 2 INJECTION, SOLUTION EPIDURAL; INFILTRATION; PERINEURAL at 18:27

## 2021-09-18 RX ADMIN — SODIUM CHLORIDE, POTASSIUM CHLORIDE, SODIUM LACTATE AND CALCIUM CHLORIDE: 600; 310; 30; 20 INJECTION, SOLUTION INTRAVENOUS at 18:38

## 2021-09-18 RX ADMIN — ROPIVACAINE HYDROCHLORIDE 200 MG: 2 INJECTION, SOLUTION EPIDURAL; INFILTRATION at 18:27

## 2021-09-18 RX ADMIN — BUPIVACAINE HYDROCHLORIDE 5 ML: 2.5 INJECTION, SOLUTION EPIDURAL; INFILTRATION; INTRACAUDAL; PERINEURAL at 18:26

## 2021-09-18 RX ADMIN — MISOPROSTOL 800 MCG: 200 TABLET ORAL at 20:26

## 2021-09-18 RX ADMIN — METHYLERGONOVINE MALEATE 0.2 MG: 0.2 INJECTION, SOLUTION INTRAMUSCULAR; INTRAVENOUS at 22:59

## 2021-09-18 RX ADMIN — OXYTOCIN 2 MILLI-UNITS/MIN: 10 INJECTION, SOLUTION INTRAMUSCULAR; INTRAVENOUS at 13:40

## 2021-09-18 RX ADMIN — SODIUM CHLORIDE, POTASSIUM CHLORIDE, SODIUM LACTATE AND CALCIUM CHLORIDE: 600; 310; 30; 20 INJECTION, SOLUTION INTRAVENOUS at 13:40

## 2021-09-18 ASSESSMENT — LIFESTYLE VARIABLES: EVER_SMOKED: NEVER

## 2021-09-18 ASSESSMENT — PAIN SCALES - GENERAL: PAIN_LEVEL: 0

## 2021-09-18 ASSESSMENT — FIBROSIS 4 INDEX: FIB4 SCORE: 0.96

## 2021-09-18 ASSESSMENT — PATIENT HEALTH QUESTIONNAIRE - PHQ9
2. FEELING DOWN, DEPRESSED, IRRITABLE, OR HOPELESS: NOT AT ALL
1. LITTLE INTEREST OR PLEASURE IN DOING THINGS: NOT AT ALL
SUM OF ALL RESPONSES TO PHQ9 QUESTIONS 1 AND 2: 0

## 2021-09-18 NOTE — PROGRESS NOTES
Pt presents to L&D for IOL. Pt ambulated to S217 for admission.     1058 TOCO and EFM applied, VSS. Pt reports +FM, denies LOF or VB. Pt okay with proceeding with FOB as , will use ipad  as needed.     1230 Orders received. RN at bedside, pt was too nervous to eat this morning, pt would like to have a small lunch prior to starting pitocin.     1330 RN at bedside, pt finished eating. Pt medicated per MAR.     1445 RN at bedside, pt beginning to feel her contractions more, pt denies any needs at this time.     1630 RN at bedside, pt uncomfortable through her contractions. Pt agrees with AROM to progress labor.     1740 Dr. Poe and RN at bedside, pt debating about an epidural, after AROM, pt requesting an epidural. Bolus started and consents signed.     1812 Dr. Vela at bedside for epidural placement, pt tolerated well.     1900 PRERNA Gallo updated on pt status.     2008 Pt called out feeling a lot of pressure    2010 RN at bedside, SVE pt complete, test push performed, all parties called for delivery.     2017 Delivery of viable baby girl, apgars 8/9, , no tears or lacerations. Mom and baby resting comfortably skin to skin. Pt encouraged to call out with any needs.     2120 Pt called out, RN at bedside, pt is shivering and wanting to know if it is normal. Pt educated on cytotec medication and that the shakes are normal with that medication. Pt encouraged to try and relax and not fight the shaking and to call out with any additional concerns.     2200 Report given to Marci LOO, POC discussed.

## 2021-09-18 NOTE — H&P
OB H&P:    CC: direct admit for labor and delivery    HPI:  Ms. Patricia Larson is a 36 y.o.  @ 39w0d as dated by early ultrasound.  Patient presents to labor and delivery today as a direct admit for the induction of labor for Advanced maternal age.  Patient denies any vaginal bleeding.  Patient denies any trauma.  Risk factors include advanced maternal age.   Patient denies any history of any sexually transmitted diseases.  Patient's past medical history is unremarkable.  FOB is at bedside.  Discussed pain management options.  Patient states that she has had 4 natural vaginal deliveries, tried an epidural on the fifth vaginal delivery and may like to try an epidural again this time however patient has not yet confirmed.  Educated patient on the benefits and  complications of an epidural, patient would like to discuss with father of baby for some time making her choice.    Contractions: yes  Loss of fluid: Yes  Vaginal bleeding: No   Fetal movement: present       [facility] with [facility]     PNL:  Rh negative, RI, HIV neg, TrepAb neg, HBsAg NR, GC/CT neg/neg  GBS positive       ROS:  Const: denies fevers, general concerns  CV/resp: reports no concerns  GI: denies abd pain, GI concerns  : see HPI  Neuro: denies HA/vision changes    OB History    Para Term  AB Living   6 5 5 0 0 5   SAB TAB Ectopic Molar Multiple Live Births   0 0 0   0 5      # Outcome Date GA Lbr Rick/2nd Weight Sex Delivery Anes PTL Lv   6 Current            5 Term 20 37w6d 15:50 / 00:13 3.875 kg (8 lb 8.7 oz) M Vag-Spont  N JESUS   4 Term 11/10/16 40w3d  4.035 kg (8 lb 14.3 oz) F Vag-Spont None N JESUS      Birth Comments: IOL. Pt states no complications   3 Term 11 40w2d  3.799 kg (8 lb 6 oz) F Vag-Spont None N JESUS      Birth Comments: No complications   2 Term 05 40w0d  3.175 kg (7 lb) F Vag-Spont None N JESUS      Birth Comments: no complications.   1 Term 03 40w0d  3.572 kg (7 lb 14 oz) F  When platelets completed and I entered rm to complete unit and get Vital Signs, pt visibly shaking.  Says feels chilled.  Afebrile.  Spoke with Jairo DELATORRE at [facility].  Linette Puente NP in [facility] is unavailable.  Will await hearing from her.   "Vag-Spont None N JESUS      Birth Comments: no complications.       GYN: denies STIs, no cervical procedures    History reviewed. No pertinent past medical history.    History reviewed. No pertinent surgical history.    No current facility-administered medications on file prior to encounter.     Current Outpatient Medications on File Prior to Encounter   Medication Sig Dispense Refill   • Prenatal MV-Min-Fe Fum-FA-DHA (PRENATAL 1 PO) Take  by mouth.     • famotidine (PEPCID) 20 MG Tab Take 1 tablet by mouth 2 times a day. 15 tablet 0       Family History   Problem Relation Age of Onset   • No Known Problems Mother    • No Known Problems Father    • No Known Problems Sister    • Cancer Maternal Grandfather        Social History     Tobacco Use   • Smoking status: Never Smoker   • Smokeless tobacco: Never Used   Vaping Use   • Vaping Use: Never used   Substance Use Topics   • Alcohol use: No     Comment: socially    • Drug use: No         PE:  Vitals:    21 1100 21 1125 21 1137   BP: 107/68     Pulse: 88 (!) 55    Resp:   18   Temp: 36.1 °C (96.9 °F)     TempSrc: Temporal     SpO2:  94%    Weight: 88.5 kg (195 lb)     Height: 1.676 m (5' 6\")       gen: AAO, NAD  abd: soft, gravid, non tender  Ext: NT, non edematous     SVE: 5-6cm/ thick/ -3 station   FHT: 130s baseline, moderate variability, positive accelerations   el:yes    A/P: 36 y.o.  @ 39w0d as dated by early ultrasound. Patient presents as a direct admit into labor and delivery for AMA.      Diet: Clear liquids  DVT prophylaxis: SCDs  Pharmacological augmentation with Pitocin  Pain management: Patient okay with IV fentanyl, chooses not to do an epidural at this time a change mind  Expectant management  Postpartum care      Briana Merrill M.D.PhD    I saw and examined the patient and discussed the management with the resident. I reviewed the resident's note and agree with the documented findings and plan of care.    Additional " attending comments:  IOL for AMA.  Pt agrees with plan of care.  Oxytocin augmentation.  FHT reassuring.     Portia Poe D.O.

## 2021-09-19 LAB
ERYTHROCYTE [DISTWIDTH] IN BLOOD BY AUTOMATED COUNT: 48.6 FL (ref 35.9–50)
HCT VFR BLD AUTO: 32.7 % (ref 37–47)
HGB BLD-MCNC: 10.9 G/DL (ref 12–16)
MCH RBC QN AUTO: 29.2 PG (ref 27–33)
MCHC RBC AUTO-ENTMCNC: 33.3 G/DL (ref 33.6–35)
MCV RBC AUTO: 87.7 FL (ref 81.4–97.8)
PLATELET # BLD AUTO: 171 K/UL (ref 164–446)
PMV BLD AUTO: 11.7 FL (ref 9–12.9)
RBC # BLD AUTO: 3.73 M/UL (ref 4.2–5.4)
WBC # BLD AUTO: 11.2 K/UL (ref 4.8–10.8)

## 2021-09-19 PROCEDURE — A9270 NON-COVERED ITEM OR SERVICE: HCPCS | Performed by: NURSE PRACTITIONER

## 2021-09-19 PROCEDURE — 36415 COLL VENOUS BLD VENIPUNCTURE: CPT

## 2021-09-19 PROCEDURE — 770002 HCHG ROOM/CARE - OB PRIVATE (112)

## 2021-09-19 PROCEDURE — A9270 NON-COVERED ITEM OR SERVICE: HCPCS | Performed by: OBSTETRICS & GYNECOLOGY

## 2021-09-19 PROCEDURE — 85027 COMPLETE CBC AUTOMATED: CPT

## 2021-09-19 PROCEDURE — 700102 HCHG RX REV CODE 250 W/ 637 OVERRIDE(OP): Performed by: OBSTETRICS & GYNECOLOGY

## 2021-09-19 PROCEDURE — 700102 HCHG RX REV CODE 250 W/ 637 OVERRIDE(OP): Performed by: NURSE PRACTITIONER

## 2021-09-19 RX ORDER — SODIUM CHLORIDE, SODIUM LACTATE, POTASSIUM CHLORIDE, CALCIUM CHLORIDE 600; 310; 30; 20 MG/100ML; MG/100ML; MG/100ML; MG/100ML
INJECTION, SOLUTION INTRAVENOUS PRN
Status: DISCONTINUED | OUTPATIENT
Start: 2021-09-19 | End: 2021-09-20 | Stop reason: HOSPADM

## 2021-09-19 RX ORDER — VITAMIN A ACETATE, BETA CAROTENE, ASCORBIC ACID, CHOLECALCIFEROL, .ALPHA.-TOCOPHEROL ACETATE, DL-, THIAMINE MONONITRATE, RIBOFLAVIN, NIACINAMIDE, PYRIDOXINE HYDROCHLORIDE, FOLIC ACID, CYANOCOBALAMIN, CALCIUM CARBONATE, FERROUS FUMARATE, ZINC OXIDE, CUPRIC OXIDE 3080; 12; 120; 400; 1; 1.84; 3; 20; 22; 920; 25; 200; 27; 10; 2 [IU]/1; UG/1; MG/1; [IU]/1; MG/1; MG/1; MG/1; MG/1; MG/1; [IU]/1; MG/1; MG/1; MG/1; MG/1; MG/1
1 TABLET, FILM COATED ORAL
Status: DISCONTINUED | OUTPATIENT
Start: 2021-09-19 | End: 2021-09-20 | Stop reason: HOSPADM

## 2021-09-19 RX ORDER — DOCUSATE SODIUM 100 MG/1
100 CAPSULE, LIQUID FILLED ORAL 2 TIMES DAILY PRN
Status: DISCONTINUED | OUTPATIENT
Start: 2021-09-19 | End: 2021-09-20 | Stop reason: HOSPADM

## 2021-09-19 RX ORDER — MISOPROSTOL 200 UG/1
600 TABLET ORAL
Status: DISCONTINUED | OUTPATIENT
Start: 2021-09-19 | End: 2021-09-20 | Stop reason: HOSPADM

## 2021-09-19 RX ADMIN — ACETAMINOPHEN 1000 MG: 500 TABLET ORAL at 05:41

## 2021-09-19 RX ADMIN — ACETAMINOPHEN 1000 MG: 500 TABLET ORAL at 15:48

## 2021-09-19 RX ADMIN — IBUPROFEN 800 MG: 800 TABLET, FILM COATED ORAL at 22:13

## 2021-09-19 RX ADMIN — IBUPROFEN 800 MG: 800 TABLET, FILM COATED ORAL at 00:14

## 2021-09-19 RX ADMIN — PRENATAL WITH FERROUS FUM AND FOLIC ACID 1 TABLET: 3080; 920; 120; 400; 22; 1.84; 3; 20; 10; 1; 12; 200; 27; 25; 2 TABLET ORAL at 08:03

## 2021-09-19 RX ADMIN — ACETAMINOPHEN 1000 MG: 500 TABLET ORAL at 22:13

## 2021-09-19 RX ADMIN — IBUPROFEN 800 MG: 800 TABLET, FILM COATED ORAL at 08:03

## 2021-09-19 RX ADMIN — ACETAMINOPHEN 1000 MG: 500 TABLET ORAL at 00:14

## 2021-09-19 RX ADMIN — IBUPROFEN 800 MG: 800 TABLET, FILM COATED ORAL at 15:48

## 2021-09-19 ASSESSMENT — EDINBURGH POSTNATAL DEPRESSION SCALE (EPDS)
I HAVE BEEN ABLE TO LAUGH AND SEE THE FUNNY SIDE OF THINGS: AS MUCH AS I ALWAYS COULD
I HAVE FELT SAD OR MISERABLE: NO, NOT AT ALL
I HAVE BEEN SO UNHAPPY THAT I HAVE HAD DIFFICULTY SLEEPING: NOT AT ALL
I HAVE LOOKED FORWARD WITH ENJOYMENT TO THINGS: AS MUCH AS I EVER DID
I HAVE BLAMED MYSELF UNNECESSARILY WHEN THINGS WENT WRONG: NO, NEVER
I HAVE BEEN ANXIOUS OR WORRIED FOR NO GOOD REASON: NO, NOT AT ALL
I HAVE BEEN SO UNHAPPY THAT I HAVE BEEN CRYING: NO, NEVER
THE THOUGHT OF HARMING MYSELF HAS OCCURRED TO ME: NEVER
I HAVE FELT SCARED OR PANICKY FOR NO GOOD REASON: NO, NOT AT ALL
THINGS HAVE BEEN GETTING ON TOP OF ME: NO, I HAVE BEEN COPING AS WELL AS EVER

## 2021-09-19 ASSESSMENT — PATIENT HEALTH QUESTIONNAIRE - PHQ9
SUM OF ALL RESPONSES TO PHQ9 QUESTIONS 1 AND 2: 0
1. LITTLE INTEREST OR PLEASURE IN DOING THINGS: NOT AT ALL
2. FEELING DOWN, DEPRESSED, IRRITABLE, OR HOPELESS: NOT AT ALL

## 2021-09-19 ASSESSMENT — PAIN DESCRIPTION - PAIN TYPE
TYPE: ACUTE PAIN

## 2021-09-19 NOTE — PROCEDURES
Delivery Note    PATIENT ID:  NAME:  Patricia Larson  MRN:               [id number]  YOB: 1985    Labor Course  Patient was admitted to Labor and Delivery at 39w0d for induction of labor due to maternal medical complication: AMA. Pt progressed well with pitocin and AROM.       On 2021  at 20:17, this 36 y.o.,  39w0d now   , GBS neg female delivered via OA/ under epidural anesthesia a viable female infant weight pending with APGAR scores of 8 and 9 at one and five minutes. Nuchal hand noted at delivery.  Baby to maternal abdomen.  Spontaneous cry.  Mouth and nares bulb suctioned by RN. Delayed cord clamping occurred with cord doubly clamped by myself and cut by FOB after pulsations had stopped.  Pitocin infusing in IVF.  Spontaneous delivery of Neely placenta grossly intact @ 20:22.  CVx3. FF and bleeding small. 800 mcg cytotec placed AK, gloves changed. Upon vaginal exam, there was no laceration. Pt and infant are stable and bonding.  Lap count: correct x 2 with Caprice Sheppard RN.  Estimated blood loss: 200mL.      Randi Gallo, MAZIN, APN  Dr. Poe, attending physician

## 2021-09-19 NOTE — ANESTHESIA PREPROCEDURE EVALUATION
Relevant Problems   NEURO   (positive) History of macrosomia in infant in prior pregnancy - [id number] x 1   (positive) History of thyroid nodule       Physical Exam    Airway   Mallampati: II  TM distance: >3 FB  Neck ROM: full       Cardiovascular - normal exam  Rhythm: regular  Rate: normal  (-) murmur     Dental - normal exam           Pulmonary - normal exam  Breath sounds clear to auscultation     Abdominal    Neurological - normal exam                 Anesthesia Plan    ASA 2       Plan - epidural   Neuraxial block will be labor analgesia                  Pertinent diagnostic labs and testing reviewed    Informed Consent:    Anesthetic plan and risks discussed with patient.

## 2021-09-19 NOTE — PROGRESS NOTES
0700-- Received report from CINDA Mendez, Infant at bedside in open crib no signs of distress.  Pt resting in bed. Discussed pain management for the day.  No further needs at the time.  Call light within reach, bed locked and in lowest position.  Rounding in place.    0815-- Assessment completed, VSS, Pt given PRN pain medication at this time.  Discussed plan of care for the day that pt is comfortable with.  All questions answered at this time.  Will continue to monitor.

## 2021-09-19 NOTE — ANESTHESIA TIME REPORT
Anesthesia Start and Stop Event Times     Date Time Event    9/18/2021 1810 Ready for Procedure     1810 Anesthesia Start     2017 Anesthesia Stop        Responsible Staff  09/18/21    Name Role Begin End    Luis Vela M.D. Anesth 1810 2017        Preop Diagnosis (Free Text):  Pre-op Diagnosis             Preop Diagnosis (Codes):    Post op Diagnosis  Pain during labor      Premium Reason  E. Weekend    Comments:

## 2021-09-19 NOTE — ANESTHESIA POSTPROCEDURE EVALUATION
Patient: Patricia Larson    Procedure Summary     Date: 09/18/21 Room / Location:     Anesthesia Start: 1810 Anesthesia Stop: 2017    Procedure: Labor Epidural Diagnosis:     Scheduled Providers:  Responsible Provider: Luis Vela M.D.    Anesthesia Type: epidural ASA Status: 2          Final Anesthesia Type: epidural  Last vitals  BP   Blood Pressure: 109/56    Temp   36.1 °C (96.9 °F)    Pulse   94   Resp   18    SpO2   94 %      Anesthesia Post Evaluation    Patient location during evaluation: floor  Patient participation: complete - patient participated  Level of consciousness: awake and alert  Pain score: 0    Airway patency: patent  Anesthetic complications: no  Cardiovascular status: hemodynamically stable  Respiratory status: acceptable  Hydration status: euvolemic    PONV: none          No complications documented.

## 2021-09-19 NOTE — CARE PLAN
Problem: Altered Physiologic Condition  Goal: Patient physiologically stable as evidenced by normal lochia, palpable uterine involution and vitals within normal limits  Outcome: Progressing  Note: Fundal massage done with light bleeding   The patient is hemodynamically stable     Shift Goal: pain controlled, minimal bleeding, rest  Clinical Goal: maintaining uterine good tone, prevent bladder distention, minimal bleeding, stable shakila signs  Progress made toward(s) clinical / shift goals:  pt verbalized acceptable level of pain    Patient is not progressing towards the following goals:

## 2021-09-19 NOTE — CARE PLAN
The patient is Stable - Low risk of patient condition declining or worsening    Shift Goals  Clinical Goals: Pt pain will be controlled with PRN pain medications    Progress made toward(s) clinical / shift goals:  Pt pain has been well managed and controlled with scheduled pain medications.  Pt knows to call for breakthrough pain.  Call light within reach of pt.  Will continue to monitor.     Patient is not progressing towards the following goals: N/A

## 2021-09-19 NOTE — LACTATION NOTE
LC visit-Mom is resting and sleepy, baby is asleep. She does not wish to wake to BF at this time. She will call me later to observe baby feeding. Mom says she is doing only BF, no bottles at this time. She had a good experience with BF her first children, and was able to BF for about a year, with no issues. She denies any nipple or breast pain at this time. FOB sleeping bedside. Encouraged Mom to call for next feeding.

## 2021-09-19 NOTE — PROGRESS NOTES
S: Pt is feeling no pain, epidural working well.    O:    Vitals:    09/18/21 1852 09/18/21 1914 09/18/21 1933 09/18/21 1954   BP: 109/55 107/53 107/55 110/65   Pulse: 80 73 76 88   Resp:       Temp:       TempSrc:       SpO2:       Weight:       Height:               FHTs:  Baseline 135, + accels, - decels, mod variability        Kieler: 3 contractions in 10 minutes, firm to palpation, pitocin @ 8 milliunits        SVE: 8/90/0 per last RN exam    A/P:    1.  IUP @ 39w0d   2.  Cat 1 FHTs    3.  Continue pitocin per protocol.    4.  Frequent position changes.    Randi Gallo, CNM, APN  Dr. Poe -- attending physician

## 2021-09-19 NOTE — ANESTHESIA PROCEDURE NOTES
Epidural Block    Date/Time: 9/18/2021 6:16 PM  Performed by: Luis Vela M.D.  Authorized by: Luis Vela M.D.     Patient Location:  OB  Start Time:  9/18/2021 6:16 PM  End Time:  9/18/2021 6:22 PM  Reason for Block: labor analgesia    patient identified, IV checked, site marked, risks and benefits discussed, surgical consent, monitors and equipment checked, pre-op evaluation and timeout performed    Patient Position:  Sitting  Prep: ChloraPrep, patient draped and sterile technique    Monitoring:  Blood pressure, continuous pulse oximetry and heart rate  Approach:  Midline  Location:  L4-L5  Injection Technique:  MARTHA saline  Skin infiltration:  Lidocaine  Strength:  1%  Dose:  3ml  Needle Type:  Tuohy  Needle Gauge:  17 G  Needle Length:  3.5 in  Loss of resistance::  6  Catheter Size:  19 G  Catheter at Skin Depth:  11  Test Dose Result:  Negative

## 2021-09-19 NOTE — PROGRESS NOTES
Post Partum Progress Note    Name:   Patricia Larson   Date/Time:  9/19/2021 - 7:50 AM  Chief Admitting Dx:  Normal labor [O80, Z37.9]  Delivery Type:  vaginal, spontaneous  Post-Op/Post Partum Days #:  1    Subjective:  Abdominal pain: no  Ambulating:   yes  Tolerating liquids:  yes  Tolerating food:  yes common adult  Flatus:   yes  BM:    no  Bleeding:   with a moderate amount of bleeding  Voiding:   yes  Dizziness:   no  Feeding:   breast    Vitals:    09/19/21 0000 09/19/21 0030 09/19/21 0100 09/19/21 0559   BP: 116/60 117/56 105/64 100/63   Pulse: 85 77 85 79   Resp:   18 18   Temp:   37.7 °C (99.8 °F) 36.1 °C (97 °F)   TempSrc:   Temporal Temporal   SpO2:   96% 98%   Weight:       Height:           Exam:  Breast: Lactating yes  Abdomen: Abdomen soft, non-tender. BS normal. No masses,  No organomegaly  Fundal Tenderness:  no  Fundus Firm: yes  Incision: none  Below umbilicus: yes  Perineum: perineum intact  Lochia: moderate  Extremities: Normal extremities, peripheral pulses and reflexes normal, no edema, redness or tenderness in the calves or thighs    Meds:  Current Facility-Administered Medications   Medication Dose   • LR infusion     • tetanus-dipth-acell pertussis (Tdap) inj 0.5 mL  0.5 mL   • PRN oxytocin (PITOCIN) (20 Units/1000 mL) PRN for excessive uterine bleeding - See Admin Instr  125-999 mL/hr   • miSOPROStol (CYTOTEC) tablet 600 mcg  600 mcg   • docusate sodium (COLACE) capsule 100 mg  100 mg   • prenatal plus vitamin (STUARTNATAL 1+1) 27-1 MG tablet 1 Tablet  1 Tablet   • oxytocin (PITOCIN) infusion (for postpartum)  2,000 mL/hr    Followed by   • oxytocin (PITOCIN) infusion (for postpartum)   mL/hr   • ibuprofen (MOTRIN) tablet 800 mg  800 mg   • acetaminophen (TYLENOL) tablet 1,000 mg  1,000 mg       Labs:   Recent Labs     09/18/21  1115 09/19/21  0451   WBC 8.6 11.2*   RBC 4.60 3.73*   HEMOGLOBIN 13.4 10.9*   HEMATOCRIT 39.9 32.7*   MCV 86.7 87.7   MCH 29.1 29.2   MCHC 33.6  33.3*   RDW 48.0 48.6   PLATELETCT 216 171   MPV 11.8 11.7       Assessment:  Chief Admitting Dx:  Normal labor [O80, Z37.9]  Delivery Type:  vaginal, spontaneous  Tubal Ligation:  no    Plan:  Continue routine post partum care.  Delivered late - desires DC home on PPD#2    ABEL Gibson.

## 2021-09-19 NOTE — PROGRESS NOTES
0100 Admitted from L and D per wheelchair, pt oriented to room educated her to call the first time go to the bathroom, Assessment done denies pain at this time, Admission care rendered.

## 2021-09-19 NOTE — PROGRESS NOTES
2200 Bedside report received from Caprice, fundus firm lochia light, waiting for epidural to wear off     2230 Fundus boggy, massaged firm, 2 large blood clots expelled, oxytocin increased to 250 ml/hr, MD updated, per MD give methergine, pad weighed = 370 ml    2320 Update given to Randi Gallo about volume of blood lost, will continue to monitor bleeding    2330 Patient up to bathroom to void, unable to empty bladder    0000 Straight cathed 800 ml, fundus firm, pad = 105 ml; total blood loss 675 ml    0100 Patient transferred to postpartum via wheelchair, bleeding light, bedside report given to Vanessa

## 2021-09-20 VITALS
BODY MASS INDEX: 31.34 KG/M2 | HEART RATE: 62 BPM | OXYGEN SATURATION: 93 % | RESPIRATION RATE: 18 BRPM | TEMPERATURE: 96.9 F | DIASTOLIC BLOOD PRESSURE: 60 MMHG | WEIGHT: 195 LBS | HEIGHT: 66 IN | SYSTOLIC BLOOD PRESSURE: 91 MMHG

## 2021-09-20 PROBLEM — O09.529 ADVANCED MATERNAL AGE IN MULTIGRAVIDA: Status: RESOLVED | Noted: 2019-12-16 | Resolved: 2021-09-20

## 2021-09-20 PROBLEM — Z64.1 GRAND MULTIPARA: Status: RESOLVED | Noted: 2021-09-14 | Resolved: 2021-09-20

## 2021-09-20 PROBLEM — O09.299 HISTORY OF MACROSOMIA IN INFANT IN PRIOR PREGNANCY, CURRENTLY PREGNANT: Status: RESOLVED | Noted: 2019-12-16 | Resolved: 2021-09-20

## 2021-09-20 PROCEDURE — 700102 HCHG RX REV CODE 250 W/ 637 OVERRIDE(OP): Performed by: OBSTETRICS & GYNECOLOGY

## 2021-09-20 PROCEDURE — A9270 NON-COVERED ITEM OR SERVICE: HCPCS | Performed by: OBSTETRICS & GYNECOLOGY

## 2021-09-20 PROCEDURE — A9270 NON-COVERED ITEM OR SERVICE: HCPCS | Performed by: NURSE PRACTITIONER

## 2021-09-20 PROCEDURE — 700102 HCHG RX REV CODE 250 W/ 637 OVERRIDE(OP): Performed by: NURSE PRACTITIONER

## 2021-09-20 RX ORDER — IBUPROFEN 800 MG/1
800 TABLET ORAL EVERY 8 HOURS
Qty: 30 TABLET | Refills: 0 | COMMUNITY
Start: 2021-09-20

## 2021-09-20 RX ORDER — ACETAMINOPHEN 500 MG
1000 TABLET ORAL EVERY 6 HOURS
Qty: 30 TABLET | Refills: 0 | Status: SHIPPED | OUTPATIENT
Start: 2021-09-20

## 2021-09-20 RX ADMIN — PRENATAL WITH FERROUS FUM AND FOLIC ACID 1 TABLET: 3080; 920; 120; 400; 22; 1.84; 3; 20; 10; 1; 12; 200; 27; 25; 2 TABLET ORAL at 07:55

## 2021-09-20 RX ADMIN — ACETAMINOPHEN 1000 MG: 500 TABLET ORAL at 10:24

## 2021-09-20 RX ADMIN — IBUPROFEN 800 MG: 800 TABLET, FILM COATED ORAL at 06:16

## 2021-09-20 RX ADMIN — ACETAMINOPHEN 1000 MG: 500 TABLET ORAL at 04:53

## 2021-09-20 ASSESSMENT — PAIN DESCRIPTION - PAIN TYPE
TYPE: ACUTE PAIN

## 2021-09-20 NOTE — LACTATION NOTE
This note was copied from a baby's chart.  Lactation follow up:    MOB requests FOB translate.  Brief history and breastfeeding plan discussed.  MOB would like to breastfeed but gave a bottle last night because baby fussy and not producing milk.    Educated on importance of feeding baby frequently and with cues during these early days of life to help promote a good milk supply and educated on risks to milk supply and breast feeding success when introducing formula.  Supplemental guidelines given and instructed to always feed baby to breast first before introducing formula and to only give minimal amount so as to not impair feeding desire or preference at breast.  Slow paced bottle feeding taught and recommended.    Hand expression taught and MOB shown that she has a large amount of colostrum with HE.  Assisted with getting baby unwrapped ad positioned at breast and helped with showig how to achieve a deep latch.  Baby latched deeply immediately and mom denies any nipple or breast pain.  MOB shown what to look for ad pointed out chin against breast, wide open mouth and flanged lips, sucking and swallowing noted with jaw glide and baby sustaining a latch and suck.  MOB reassured. MOB states that she does not want to give formula and so recommended to keep baby skin to skin and to feed whenever she is showing interest.  Both parents happy and showing desire to follow this recommendation.    Currently enrolled in [facility] and encouraged to reach out and call once home for breastfeeding support.    Breastfeeding plan:    Feed baby with feeding cues and at least a minimum of 8x/24 hours.  Expect cluster feeding as this is normal during early days of life and growth spurts.  It is not recommended to let baby sleep longer than 4 hours between feedings and if sleepy, place skin to skin to promote feeding interest and milk production.  Baby's usually feed more frequently and longer when skin to skin with mother.  It is not recommended to use pacifiers or supplementing with formula until breast feeding and milk production is well established or at least 4 weeks.    Make sure infant is getting enough by ensuring frequent feedings as well as looking for transitioning stools from dark meconium to bright yellow/green seedy loose stools by day 5.     Follow up with PEDS PCP as scheduled for weight checks and to make sure feeding is progressing appropriately.

## 2021-09-20 NOTE — CARE PLAN
Problem: Knowledge Deficit - Postpartum  Goal: Patient will verbalize and demonstrate understanding of self and infant care  Outcome: Progressing     Problem: Altered Physiologic Condition  Goal: Patient physiologically stable as evidenced by normal lochia, palpable uterine involution and vitals within normal limits  Outcome: Progressing     Problem: Infection - Postpartum  Goal: Postpartum patient will be free of signs and symptoms of infection  Outcome: Progressing     The patient is Stable - Low risk of patient condition declining or worsening    Shift Goals  Clinical Goals: pain control, lochia wnl    Progress made toward(s) clinical / shift goals:  Lochia remains light without clots, pt performing prudence care without difficulty, supplies provided. Vitals stable, remains afebrile, no s/s infection noted. Both parents involved in infant care, demonstrate appropriate understanding of  care, responsive to cues, feeding independently and without difficulty, good bonding observed.

## 2021-09-20 NOTE — DISCHARGE SUMMARY
Discharge Summary:      Patricia Larson    Admit Date:   2021  Discharge Date:  2021     Admitting diagnosis:  Normal labor [O80, Z37.9]  Discharge Diagnosis: Status post vaginal, spontaneous.  Pregnancy Complications: grand multiparous, AMA  Tubal Ligation:  no        History:  History reviewed. No pertinent past medical history.  OB History    Para Term  AB Living   6 6 6 0 0 6   SAB TAB Ectopic Molar Multiple Live Births   0 0 0   0 6      # Outcome Date GA Lbr Rick/2nd Weight Sex Delivery Anes PTL Lv   6 Term 21 39w0d / 00:07 3.965 kg (8 lb 11.9 oz) F Vag-Spont EPI N JESUS   5 Term 20 37w6d 15:50 / 00:13 3.875 kg (8 lb 8.7 oz) M Vag-Spont  N JESUS   4 Term 11/10/16 40w3d  4.035 kg (8 lb 14.3 oz) F Vag-Spont None N JESUS      Birth Comments: IOL. Pt states no complications   3 Term 11 40w2d  3.799 kg (8 lb 6 oz) F Vag-Spont None N JESUS      Birth Comments: No complications   2 Term 05 40w0d  3.175 kg (7 lb) F Vag-Spont None N JESUS      Birth Comments: no complications.   1 Term 03 40w0d  3.572 kg (7 lb 14 oz) F Vag-Spont None N JESUS      Birth Comments: no complications.        Patient has no known allergies.  Patient Active Problem List    Diagnosis Date Noted   • Cholelithiasis 2021   • History of thyroid nodule 2019        Hospital Course:   36 y.o. , now para 6, was admitted with the above mentioned diagnosis, underwent Induction of Labor with pitocin where she progressed to have a vaginal delivery over intact perineum. Patient postpartum course was unremarkable, with progressive advancement in diet , ambulation and toleration of oral analgesia. Patient without complaints today and desires discharge.      Vitals:    21 0030 21 0100 21 0559 21 1800   BP: 117/56 105/64 100/63 111/66   Pulse: 77 85 79 84   Resp:  18 18 16   Temp:  37.7 °C (99.8 °F) 36.1 °C (97 °F) 36.5 °C (97.7 °F)   TempSrc:  Temporal Temporal  Temporal   SpO2:  96% 98% 96%   Weight:       Height:           Current Facility-Administered Medications   Medication Dose   • LR infusion     • tetanus-dipth-acell pertussis (Tdap) inj 0.5 mL  0.5 mL   • PRN oxytocin (PITOCIN) (20 Units/1000 mL) PRN for excessive uterine bleeding - See Admin Instr  125-999 mL/hr   • miSOPROStol (CYTOTEC) tablet 600 mcg  600 mcg   • docusate sodium (COLACE) capsule 100 mg  100 mg   • prenatal plus vitamin (STUARTNATAL 1+1) 27-1 MG tablet 1 Tablet  1 Tablet   • oxytocin (PITOCIN) infusion (for postpartum)   mL/hr   • ibuprofen (MOTRIN) tablet 800 mg  800 mg   • acetaminophen (TYLENOL) tablet 1,000 mg  1,000 mg       Exam:  Breast Exam: negative  Abdomen: Abdomen soft, non-tender. BS normal. No masses,  No organomegaly  Fundus Non Tender: yes  Incision: none  Perineum: perineum intact  Extremity: extremities, peripheral pulses and reflexes normal     Labs:  Recent Labs     09/18/21  1115 09/19/21  0451   WBC 8.6 11.2*   RBC 4.60 3.73*   HEMOGLOBIN 13.4 10.9*   HEMATOCRIT 39.9 32.7*   MCV 86.7 87.7   MCH 29.1 29.2   MCHC 33.6 33.3*   RDW 48.0 48.6   PLATELETCT 216 171   MPV 11.8 11.7        Activity:   Discharge to home  Pelvic Rest x 6 weeks    Assessment:  normal postpartum course  Discharge Assessment: No areas of skin breakdown/redness; surgical incision intact/healing     Follow up: [facility] in 5 weeks for vaginal      Discharge Meds:   Current Outpatient Medications   Medication Sig Dispense Refill   • acetaminophen (TYLENOL) 500 MG Tab Take 2 Tablets by mouth every 6 hours. 30 Tablet 0   • ibuprofen (MOTRIN) 800 MG Tab Take 1 Tablet by mouth every 8 hours. 30 Tablet 0     I reviewed in detail with patient all indications that would necessitate emergency care. We reviewed bleeding expectations as well as expected healing with perineal repairs. We discussed birth control options and she is aware that can order this at her 6 week PP appointment. Finally, we  reviewed postpartum depression and anxiety. She verbalizes understanding.  I have encouraged pelvic rest and use of condom if she does desire to have intercourse.    YESICA Lozoya

## 2021-09-20 NOTE — DISCHARGE INSTRUCTIONS
PATIENT DISCHARGE EDUCATION INSTRUCTION SHEET  REASONS TO CALL YOUR OBSTETRICIAN  · Persistent fever, shaking, chills (Temperature higher than 100.4) may indicate you have an infection  · Heavy bleeding: soaking more than 1 pad per hour; Passing clots an egg-sized clot or bigger may mean you have an postpartum hemorrhage  · Foul odor from vagina or bad smelling or discolored discharge or blood  · Breast infection (Mastitis symptoms); breast pain, chills, fever, redness or red streaks, may feel flu like symptoms  · Urinary pain, burning or frequency  · Incision that is not healing, increased redness, swelling, tenderness or pain, or any pus from episiotomy or  site may mean you have an infection  · Redness, swelling, warmth, or painful to touch in the calf area of your leg may mean you have a blood clot  · Severe or intensified depression, thoughts or feelings of wanting to hurt yourself or someone else   · Pain in chest, obstructed breathing or shortness of breath (trouble catching your breath) may mean you are having a postpartum complication. Call your provider immediately   · Headache that does not get better, even after taking medicine, a bad headache with vision changes or pain in the upper right area of your belly may mean you have high blood pressure or post birth preeclampsia. Call your provider immediately    HAND WASHING  All family and friends should wash their hands:  · Before and after holding the baby  · Before feeding the baby  · After using the restroom or changing the baby's diaper    WOUND CARE  Ask your physician for additional care instructions. In general:  ·  Incision:  · May shower and pat incision dry   · Keep the incision clean and dry  · There should not be any opening or pus from the incision  · Continue to walk at home 3 times a day   · Do NOT lift anything heavier than your baby (over 10 pounds)  · Encourage family to help participate in care of the  to allow  rest and mom time to heal  · Episiotomy/Laceration  · May use prudence-spray bottle, witch hazel pads and dermaplast spray for comfort  · Use prudence-spray bottle after urinating to cleanse perineal area  · To prevent burning during urination spray prudence-water bottle on labial area   · Pat perineal area dry until episiotomy/laceration is healed  · Continue to use prudence-bottle until bleeding stops as needed  · If have a 2nd degree laceration or greater, a Sitz bath can offer relief from soreness, burning, and inflammation   · Sitz Bath   · Sit in 6 inches of warm water and soak laceration as needed until the laceration heals    VAGINAL CARE AND BLEEDING  · Nothing inside vagina for 6 weeks:   · No sexual intercourse, tampons or douching  · Bleeding may continue for 2-4 weeks. Amount and color may vary  · Soaking 1 pad or more in an hour for several hours is considered heavy bleeding  · Passing large egg sized blood clots can be concerning  · If you feel like you have heavy bleeding or are having increasing amount of blood clots call your Obstetrician immediately  · If you begin feeling faint upon standing, feeling sick to your stomach, have clammy skin, a really fast heartbeat, have chills, start feeling confused, dizzy, sleepy or weak, or feeling like you're going to faint call your Obstetrician immediately    HYPERTENSION   Preeclampsia or gestational hypertension are types of high blood pressure that only pregnant women can get. It is important for you to be aware of symptoms to seek early intervention and treatment. If you have any of these symptoms immediately call your Obstetrician    · Vision changes or blurred vision   · Severe headache or pain that is unrelieved with medication and will not go away  · Persistent pain in upper abdomen or shoulder   · Increased swelling of face, feet, or hands  · Difficulty breathing or shortness of breath at rest  · Urinating less than usual    URINATION AND BOWEL MOVEMENTS  · Eating  "more fiber (bran cereal, fruits, and vegetables) and drinking plenty of fluids will help to avoid constipation  · Urinary frequency and urgency after childbirth is normal  · If you experience any urinary pain, burning or frequency call your provider    BIRTH CONTROL  · It is possible to become pregnant at any time after delivery and while breastfeeding  · Plan to discuss a method of birth control with your physician at your post delivery follow up visit    POSTPARTUM BLUES  During the first few days after birth, you may experience a sense of the \"blues\" which may include impatience, irritability or even crying. These feelings come and go quickly. However, as many as 1 in 10 women experience emotional symptoms known as postpartum depression.     POSTPARTUM DEPRESSION    May start as early as the second or third day after delivery or take several weeks or months to develop. Symptoms of \"blues\" are present, but are more intense: Crying spells; loss of appetite; feelings of hopelessness or loss of control; fear of touching the baby; over concern or no concern at all about the baby; little or no concern about your own appearance/caring for yourself; and/or inability to sleep or excessive sleeping. Contact your Obstetrician if you are experiencing any of these symptoms     PREVENTING SHAKEN BABY  If you are angry or stressed, PUT THE BABY IN THE CRIB, step away, take some deep breaths, and wait until you are calm to care for the baby. DO NOT SHAKE THE BABY. You are not alone, call a supporter for help.  · [facility] 24/7 crisis call line (967-672-1898) or (1-284.281.9436)  · You can also text them, text \"ANSWER\" (138165)      "

## 2021-10-25 ENCOUNTER — POST PARTUM (OUTPATIENT)
Dept: OBGYN | Facility: CLINIC | Age: 36
End: 2021-10-25

## 2021-10-25 VITALS — BODY MASS INDEX: 28.41 KG/M2 | DIASTOLIC BLOOD PRESSURE: 70 MMHG | SYSTOLIC BLOOD PRESSURE: 110 MMHG | WEIGHT: 176 LBS

## 2021-10-25 PROCEDURE — 0503F POSTPARTUM CARE VISIT: CPT | Performed by: NURSE PRACTITIONER

## 2021-10-25 ASSESSMENT — ENCOUNTER SYMPTOMS
EYES NEGATIVE: 1
PSYCHIATRIC NEGATIVE: 1
CARDIOVASCULAR NEGATIVE: 1
RESPIRATORY NEGATIVE: 1
GASTROINTESTINAL NEGATIVE: 1
NEUROLOGICAL NEGATIVE: 1
MUSCULOSKELETAL NEGATIVE: 1
CONSTITUTIONAL NEGATIVE: 1

## 2021-10-25 ASSESSMENT — EDINBURGH POSTNATAL DEPRESSION SCALE (EPDS)
I HAVE BEEN SO UNHAPPY THAT I HAVE HAD DIFFICULTY SLEEPING: NOT AT ALL
I HAVE BLAMED MYSELF UNNECESSARILY WHEN THINGS WENT WRONG: NO, NEVER
I HAVE BEEN ABLE TO LAUGH AND SEE THE FUNNY SIDE OF THINGS: AS MUCH AS I ALWAYS COULD
I HAVE FELT SCARED OR PANICKY FOR NO GOOD REASON: NO, NOT AT ALL
TOTAL SCORE: 0
I HAVE LOOKED FORWARD WITH ENJOYMENT TO THINGS: AS MUCH AS I EVER DID
I HAVE BEEN SO UNHAPPY THAT I HAVE BEEN CRYING: NO, NEVER
I HAVE BEEN ANXIOUS OR WORRIED FOR NO GOOD REASON: NO, NOT AT ALL
THINGS HAVE BEEN GETTING ON TOP OF ME: NO, I HAVE BEEN COPING AS WELL AS EVER
THE THOUGHT OF HARMING MYSELF HAS OCCURRED TO ME: NEVER
I HAVE FELT SAD OR MISERABLE: NO, NOT AT ALL

## 2021-10-25 ASSESSMENT — FIBROSIS 4 INDEX: FIB4 SCORE: 1.19

## 2021-10-25 NOTE — PROGRESS NOTES
Subjective     Patricia Larson is a 36 y.o. female who presents with No chief complaint on file.            S   37y/o now  s/p  on 21 of baby without complications. No laceration. Now 5 weeks postpartum. Prenatal course significant for AMA. Postpartum course without any complications. Feeling well and happy with baby, denies any severe mood swings or s/sx of postpartum depression and anxiety.      Baby is doing well, breastfeeding and some formula supplementation.  No issues with breastfeeding at this time.  Has not resumed sexual activity.  Mother reports no issues with bowel or bladder routine, continued regular diet. Bleeding since birth has subsided at this time with no return to menses. No vaginal pain/odor/itching, fever, headaches, dizziness/SOB or dysuria. Desires implant for contraception.     O  See PE: Physical exam today with no abnormal findings  Vital signs WNL: BP and weight   PAP: NILM 2019    A  Reassuring exam of lactating postpartum woman s/p  on 21    P  - MRN submitted for pat, pt will abstain from sex or use condoms in meantime as declines any other bridge method   - Resumption of sexual activity: safe sex precautions given  - Counseling on nutrition, adequate hydration, and exercise   - Kegel Exercises for pelvic floor/prevention of urinary incontinence   - EPDS: 0  - Continue PNV for breastfeeding mother  - Counseling on PAP guidelines re: next PAP due   - Warning s/sx of postpartum infection, depression, preeclampsia   - RTC PRN             Review of Systems   Constitutional: Negative.    HENT: Negative.    Eyes: Negative.    Respiratory: Negative.    Cardiovascular: Negative.    Gastrointestinal: Negative.    Genitourinary: Negative.    Musculoskeletal: Negative.    Skin: Negative.    Neurological: Negative.    Endo/Heme/Allergies: Negative.    Psychiatric/Behavioral: Negative.               Objective     /70   Wt 79.8 kg (176 lb)   LMP  (LMP  Unknown)   BMI 28.41 kg/m²      Physical Exam  Constitutional:       Appearance: Normal appearance.   HENT:      Head: Normocephalic and atraumatic.      Nose: Nose normal.      Mouth/Throat:      Mouth: Mucous membranes are moist.   Cardiovascular:      Rate and Rhythm: Normal rate and regular rhythm.      Pulses: Normal pulses.      Heart sounds: Normal heart sounds.   Pulmonary:      Effort: Pulmonary effort is normal.      Breath sounds: Normal breath sounds.   Abdominal:      General: Abdomen is flat.   Musculoskeletal:         General: Normal range of motion.      Cervical back: Normal range of motion and neck supple.   Skin:     General: Skin is warm and dry.      Capillary Refill: Capillary refill takes more than 3 seconds.   Neurological:      Mental Status: She is alert.   Psychiatric:         Mood and Affect: Mood normal.         Behavior: Behavior normal.         Thought Content: Thought content normal.         Judgment: Judgment normal.                             Assessment & Plan        There are no diagnoses linked to this encounter.

## 2021-10-25 NOTE — PROGRESS NOTES
Pt here today for postpartum exam.  Delivery type: 9/18/21 Vaginal no laceration   Currently : Breast feeding  Self pay: [facility] for [facility] given to patient.  Desired BCM: Desires Nexplanon   LMP: No menses at this time   Last pap: 9/23/19 Negative  [phone] (home)   EPDS 0

## 2021-10-26 ENCOUNTER — TELEPHONE (OUTPATIENT)
Dept: OBGYN | Facility: CLINIC | Age: 36
End: 2021-10-26

## 2021-11-09 ENCOUNTER — TELEPHONE (OUTPATIENT)
Dept: OBGYN | Facility: CLINIC | Age: 36
End: 2021-11-09

## 2021-11-16 ENCOUNTER — TELEPHONE (OUTPATIENT)
Dept: OBGYN | Facility: CLINIC | Age: 36
End: 2021-11-16

## 2021-11-29 NOTE — TELEPHONE ENCOUNTER
Pt called inquiring about appt for Nexplanon insertion, pt stated she has not received a call. I informed pt we have called her 3 times and have been trying to get a hold of her, but we have not been able to. Informed pt will have Kristen-PAR give her a call back. Pt understood and had no other questions or concerns

## 2021-12-13 NOTE — PROGRESS NOTES
Pt here for nexplanon insert  Pt states no concerns   Good #[phone]  Pharmacy verified.  interp Id:[id number]

## 2021-12-14 ENCOUNTER — GYNECOLOGY VISIT (OUTPATIENT)
Dept: OBGYN | Facility: CLINIC | Age: 36
End: 2021-12-14
Payer: OTHER GOVERNMENT

## 2021-12-14 VITALS — DIASTOLIC BLOOD PRESSURE: 68 MMHG | SYSTOLIC BLOOD PRESSURE: 100 MMHG | WEIGHT: 178 LBS | BODY MASS INDEX: 28.73 KG/M2

## 2021-12-14 DIAGNOSIS — Z30.017 NEXPLANON INSERTION: ICD-10-CM

## 2021-12-14 LAB
INT CON NEG: NORMAL
INT CON POS: NORMAL
POC URINE PREGNANCY TEST: NEGATIVE

## 2021-12-14 PROCEDURE — 81025 URINE PREGNANCY TEST: CPT | Performed by: OBSTETRICS & GYNECOLOGY

## 2021-12-14 PROCEDURE — 11981 INSERTION DRUG DLVR IMPLANT: CPT | Mod: GC | Performed by: OBSTETRICS & GYNECOLOGY

## 2021-12-14 ASSESSMENT — FIBROSIS 4 INDEX: FIB4 SCORE: 1.19

## 2021-12-14 NOTE — PROGRESS NOTES
Procedure Note : Nexplanon Insertion      CC: desires nexplanon     HPI: 36 y.o.yo  here for desired nexplanon insertion.  Denies concerns. She has used OCPs in the past and would like to use the Nexplanon for contraception.   UPT neg     No past medical history on file.  Allergies: Patient has no known allergies.     ROS:   Gen: denies concerns  : denies concerns     Nexplanon Insertion  Pt is right handed  Left upper extremity positioned so the medial aspect exposed.  Inferior to the groove between the biceps/triceps, over the triceps, and approximately 8-10cm from the medial epicondyle  cleansed with betadine x3. approximately 3cc of 1% licocaine infiltrated along planned insertion path.    Nexplanon applicator placed with bevel of needle down, skin lifted, device inserted to hub   Lever deployed and nexplanon released. Implant palpated by myself and patient in proper location   Small needle puncture hemostatic, steri strip placed, pressure dressing applied  Pt tolerated procedures well.  Complications: none     A/P: 36 y.o.  s/p desired nexplanon insertion   - prior to insertion discussed risks including pain/bleeding/infection. Advised to expect some bruising, NSAIDs/tylenol prn discomfort.  Discussed likely irregular VB, possible amenorrhea with nexplanon as well as risk of pregnancy.  All questions answered and consents signed.  Remove pressure dressing tonight or when uncomfortable    Merritt Cueva MD, PGY-3    Patient was seen with and procedure done under the supervision of Dr. Burk.

## 2022-01-12 ENCOUNTER — GYNECOLOGY VISIT (OUTPATIENT)
Dept: OBGYN | Facility: CLINIC | Age: 37
End: 2022-01-12
Payer: OTHER GOVERNMENT

## 2022-01-12 VITALS
DIASTOLIC BLOOD PRESSURE: 66 MMHG | HEIGHT: 64 IN | WEIGHT: 180 LBS | BODY MASS INDEX: 30.73 KG/M2 | SYSTOLIC BLOOD PRESSURE: 112 MMHG

## 2022-01-12 DIAGNOSIS — Z30.46 ENCOUNTER FOR SURVEILLANCE OF NEXPLANON SUBDERMAL CONTRACEPTIVE: Primary | ICD-10-CM

## 2022-01-12 PROCEDURE — 99212 OFFICE O/P EST SF 10 MIN: CPT | Performed by: NURSE PRACTITIONER

## 2022-01-12 ASSESSMENT — FIBROSIS 4 INDEX: FIB4 SCORE: 1.19

## 2022-01-12 NOTE — PROGRESS NOTES
S: Pt here for contraceptive surveillance. She had a Nexplanon placed on 12/14/2021. She reports no pain, redness, or swelling at implant site but does report it's still a little sore with touch. Overall pt is happy with Nexplanon. Her LMP was on unsure as spotting on and off since placement.     O: Upper inner aspect of left arm palpated. Implant  palpated. No tenderness, redness or swelling.    A/P: Discussed irreg bleeding normal for 3-6 months following Nexplanon placement.          Pt reminded to have replaced or removed in 3 yrs.          Reminded of annual well woman visit.

## 2024-07-23 ENCOUNTER — HOSPITAL ENCOUNTER (EMERGENCY)
Facility: MEDICAL CENTER | Age: 39
End: 2024-07-23
Attending: EMERGENCY MEDICINE
Payer: COMMERCIAL

## 2024-07-23 ENCOUNTER — APPOINTMENT (OUTPATIENT)
Dept: RADIOLOGY | Facility: MEDICAL CENTER | Age: 39
End: 2024-07-23
Attending: EMERGENCY MEDICINE
Payer: COMMERCIAL

## 2024-07-23 VITALS
RESPIRATION RATE: 20 BRPM | TEMPERATURE: 98.9 F | WEIGHT: 170.64 LBS | SYSTOLIC BLOOD PRESSURE: 96 MMHG | DIASTOLIC BLOOD PRESSURE: 51 MMHG | OXYGEN SATURATION: 98 % | HEIGHT: 64 IN | BODY MASS INDEX: 29.13 KG/M2 | HEART RATE: 101 BPM

## 2024-07-23 DIAGNOSIS — K52.9 COLITIS: ICD-10-CM

## 2024-07-23 LAB
ALBUMIN SERPL BCP-MCNC: 4.2 G/DL (ref 3.2–4.9)
ALBUMIN/GLOB SERPL: 1.1 G/DL
ALP SERPL-CCNC: 90 U/L (ref 30–99)
ALT SERPL-CCNC: 19 U/L (ref 2–50)
ANION GAP SERPL CALC-SCNC: 17 MMOL/L (ref 7–16)
AST SERPL-CCNC: 14 U/L (ref 12–45)
BASOPHILS # BLD AUTO: 0.2 % (ref 0–1.8)
BASOPHILS # BLD: 0.03 K/UL (ref 0–0.12)
BILIRUB SERPL-MCNC: 0.8 MG/DL (ref 0.1–1.5)
BUN SERPL-MCNC: 17 MG/DL (ref 8–22)
CALCIUM ALBUM COR SERPL-MCNC: 8.7 MG/DL (ref 8.5–10.5)
CALCIUM SERPL-MCNC: 8.9 MG/DL (ref 8.4–10.2)
CHLORIDE SERPL-SCNC: 102 MMOL/L (ref 96–112)
CO2 SERPL-SCNC: 19 MMOL/L (ref 20–33)
CREAT SERPL-MCNC: 1.3 MG/DL (ref 0.5–1.4)
EOSINOPHIL # BLD AUTO: 0 K/UL (ref 0–0.51)
EOSINOPHIL NFR BLD: 0 % (ref 0–6.9)
ERYTHROCYTE [DISTWIDTH] IN BLOOD BY AUTOMATED COUNT: 43.2 FL (ref 35.9–50)
FLUAV RNA SPEC QL NAA+PROBE: NEGATIVE
FLUBV RNA SPEC QL NAA+PROBE: NEGATIVE
GFR SERPLBLD CREATININE-BSD FMLA CKD-EPI: 53 ML/MIN/1.73 M 2
GLOBULIN SER CALC-MCNC: 3.7 G/DL (ref 1.9–3.5)
GLUCOSE SERPL-MCNC: 124 MG/DL (ref 65–99)
HCG SERPL QL: NEGATIVE
HCT VFR BLD AUTO: 42.5 % (ref 37–47)
HGB BLD-MCNC: 14.3 G/DL (ref 12–16)
IMM GRANULOCYTES # BLD AUTO: 0.05 K/UL (ref 0–0.11)
IMM GRANULOCYTES NFR BLD AUTO: 0.4 % (ref 0–0.9)
LYMPHOCYTES # BLD AUTO: 0.35 K/UL (ref 1–4.8)
LYMPHOCYTES NFR BLD: 2.6 % (ref 22–41)
MCH RBC QN AUTO: 30 PG (ref 27–33)
MCHC RBC AUTO-ENTMCNC: 33.6 G/DL (ref 32.2–35.5)
MCV RBC AUTO: 89.1 FL (ref 81.4–97.8)
MONOCYTES # BLD AUTO: 0.52 K/UL (ref 0–0.85)
MONOCYTES NFR BLD AUTO: 3.9 % (ref 0–13.4)
NEUTROPHILS # BLD AUTO: 12.54 K/UL (ref 1.82–7.42)
NEUTROPHILS NFR BLD: 92.9 % (ref 44–72)
NRBC # BLD AUTO: 0 K/UL
NRBC BLD-RTO: 0 /100 WBC (ref 0–0.2)
PLATELET # BLD AUTO: 222 K/UL (ref 164–446)
PMV BLD AUTO: 11.1 FL (ref 9–12.9)
POTASSIUM SERPL-SCNC: 4.2 MMOL/L (ref 3.6–5.5)
PROT SERPL-MCNC: 7.9 G/DL (ref 6–8.2)
RBC # BLD AUTO: 4.77 M/UL (ref 4.2–5.4)
RSV RNA SPEC QL NAA+PROBE: NEGATIVE
SARS-COV-2 RNA RESP QL NAA+PROBE: NOTDETECTED
SODIUM SERPL-SCNC: 138 MMOL/L (ref 135–145)
SPECIMEN SOURCE: NORMAL
WBC # BLD AUTO: 13.5 K/UL (ref 4.8–10.8)

## 2024-07-23 PROCEDURE — 0241U HCHG SARS-COV-2 COVID-19 NFCT DS RESP RNA 4 TRGT MIC: CPT

## 2024-07-23 PROCEDURE — A9270 NON-COVERED ITEM OR SERVICE: HCPCS | Performed by: EMERGENCY MEDICINE

## 2024-07-23 PROCEDURE — 700117 HCHG RX CONTRAST REV CODE 255: Performed by: EMERGENCY MEDICINE

## 2024-07-23 PROCEDURE — 700111 HCHG RX REV CODE 636 W/ 250 OVERRIDE (IP): Mod: JZ | Performed by: EMERGENCY MEDICINE

## 2024-07-23 PROCEDURE — 84703 CHORIONIC GONADOTROPIN ASSAY: CPT

## 2024-07-23 PROCEDURE — 74177 CT ABD & PELVIS W/CONTRAST: CPT

## 2024-07-23 PROCEDURE — 36415 COLL VENOUS BLD VENIPUNCTURE: CPT

## 2024-07-23 PROCEDURE — 80053 COMPREHEN METABOLIC PANEL: CPT

## 2024-07-23 PROCEDURE — 85025 COMPLETE CBC W/AUTO DIFF WBC: CPT

## 2024-07-23 PROCEDURE — 96374 THER/PROPH/DIAG INJ IV PUSH: CPT

## 2024-07-23 PROCEDURE — 99285 EMERGENCY DEPT VISIT HI MDM: CPT

## 2024-07-23 PROCEDURE — 96375 TX/PRO/DX INJ NEW DRUG ADDON: CPT

## 2024-07-23 PROCEDURE — 700102 HCHG RX REV CODE 250 W/ 637 OVERRIDE(OP): Performed by: EMERGENCY MEDICINE

## 2024-07-23 PROCEDURE — 700105 HCHG RX REV CODE 258: Performed by: EMERGENCY MEDICINE

## 2024-07-23 RX ORDER — PROCHLORPERAZINE EDISYLATE 5 MG/ML
5 INJECTION INTRAMUSCULAR; INTRAVENOUS ONCE
Status: COMPLETED | OUTPATIENT
Start: 2024-07-23 | End: 2024-07-23

## 2024-07-23 RX ORDER — DIPHENHYDRAMINE HYDROCHLORIDE 50 MG/ML
12.5 INJECTION INTRAMUSCULAR; INTRAVENOUS ONCE
Status: COMPLETED | OUTPATIENT
Start: 2024-07-23 | End: 2024-07-23

## 2024-07-23 RX ORDER — AMOXICILLIN AND CLAVULANATE POTASSIUM 875; 125 MG/1; MG/1
1 TABLET, FILM COATED ORAL 2 TIMES DAILY
Qty: 10 TABLET | Refills: 0 | Status: ACTIVE | OUTPATIENT
Start: 2024-07-23 | End: 2024-07-28

## 2024-07-23 RX ORDER — ONDANSETRON 4 MG/1
4 TABLET, ORALLY DISINTEGRATING ORAL EVERY 6 HOURS PRN
Qty: 10 TABLET | Refills: 0 | Status: SHIPPED | OUTPATIENT
Start: 2024-07-23

## 2024-07-23 RX ORDER — AMOXICILLIN AND CLAVULANATE POTASSIUM 875; 125 MG/1; MG/1
1 TABLET, FILM COATED ORAL ONCE
Status: COMPLETED | OUTPATIENT
Start: 2024-07-23 | End: 2024-07-23

## 2024-07-23 RX ORDER — SODIUM CHLORIDE, SODIUM LACTATE, POTASSIUM CHLORIDE, CALCIUM CHLORIDE 600; 310; 30; 20 MG/100ML; MG/100ML; MG/100ML; MG/100ML
1000 INJECTION, SOLUTION INTRAVENOUS ONCE
Status: COMPLETED | OUTPATIENT
Start: 2024-07-23 | End: 2024-07-23

## 2024-07-23 RX ADMIN — PROCHLORPERAZINE EDISYLATE 5 MG: 5 INJECTION INTRAMUSCULAR; INTRAVENOUS at 20:37

## 2024-07-23 RX ADMIN — IOHEXOL 100 ML: 350 INJECTION, SOLUTION INTRAVENOUS at 21:28

## 2024-07-23 RX ADMIN — DIPHENHYDRAMINE HYDROCHLORIDE 12.5 MG: 50 INJECTION, SOLUTION INTRAMUSCULAR; INTRAVENOUS at 20:36

## 2024-07-23 RX ADMIN — SODIUM CHLORIDE, POTASSIUM CHLORIDE, SODIUM LACTATE AND CALCIUM CHLORIDE 1000 ML: 600; 310; 30; 20 INJECTION, SOLUTION INTRAVENOUS at 20:35

## 2024-07-23 RX ADMIN — AMOXICILLIN AND CLAVULANATE POTASSIUM 1 TABLET: 875; 125 TABLET, FILM COATED ORAL at 22:30

## 2024-07-23 ASSESSMENT — PAIN DESCRIPTION - DESCRIPTORS: DESCRIPTORS: ACHING

## 2024-09-25 ENCOUNTER — HOSPITAL ENCOUNTER (OUTPATIENT)
Dept: RADIOLOGY | Facility: MEDICAL CENTER | Age: 39
End: 2024-09-25
Attending: FAMILY MEDICINE
Payer: COMMERCIAL

## 2024-09-25 DIAGNOSIS — Z12.31 VISIT FOR SCREENING MAMMOGRAM: ICD-10-CM

## 2024-09-25 PROCEDURE — 77067 SCR MAMMO BI INCL CAD: CPT
